# Patient Record
Sex: MALE | Race: WHITE
[De-identification: names, ages, dates, MRNs, and addresses within clinical notes are randomized per-mention and may not be internally consistent; named-entity substitution may affect disease eponyms.]

---

## 2017-08-02 ENCOUNTER — HOSPITAL ENCOUNTER (OUTPATIENT)
Dept: HOSPITAL 76 - LAB.R | Age: 82
End: 2017-08-02
Attending: INTERNAL MEDICINE
Payer: MEDICARE

## 2017-08-02 DIAGNOSIS — E78.5: ICD-10-CM

## 2017-08-02 DIAGNOSIS — Z12.5: Primary | ICD-10-CM

## 2017-08-02 DIAGNOSIS — Z79.899: ICD-10-CM

## 2017-08-02 LAB
ALBUMIN/GLOB SERPL: 1.3 {RATIO} (ref 1–2.2)
ANION GAP SERPL CALCULATED.4IONS-SCNC: 8 MMOL/L (ref 6–13)
BILIRUB BLD-MCNC: 0.7 MG/DL (ref 0.2–1)
BUN SERPL-MCNC: 29 MG/DL (ref 6–20)
CALCIUM UR-MCNC: 9.1 MG/DL (ref 8.5–10.3)
CHLORIDE SERPL-SCNC: 105 MMOL/L (ref 101–111)
CHOLEST SERPL-MCNC: 231 MG/DL
CO2 SERPL-SCNC: 27 MMOL/L (ref 21–32)
CREAT SERPLBLD-SCNC: 1 MG/DL (ref 0.6–1.2)
GFRSERPLBLD MDRD-ARVRAT: 72 ML/MIN/{1.73_M2} (ref 89–?)
GLOBULIN SER-MCNC: 3 G/DL (ref 2.1–4.2)
GLUCOSE SERPL-MCNC: 96 MG/DL (ref 70–100)
HDLC SERPL-MCNC: 88 MG/DL
HDLC SERPL: 2.6 {RATIO} (ref ?–5)
LDLC/HDLC SERPL: 1.5 {RATIO} (ref ?–3.6)
POTASSIUM SERPL-SCNC: 4.3 MMOL/L (ref 3.5–5)
PROT SPEC-MCNC: 6.9 G/DL (ref 6.7–8.2)
SODIUM SERPLBLD-SCNC: 140 MMOL/L (ref 135–145)
TRIGL P FAST SERPL-MCNC: 52 MG/DL
VLDLC SERPL-SCNC: 10 MG/DL

## 2017-08-02 PROCEDURE — 80061 LIPID PANEL: CPT

## 2017-08-02 PROCEDURE — 84153 ASSAY OF PSA TOTAL: CPT

## 2017-08-02 PROCEDURE — 82550 ASSAY OF CK (CPK): CPT

## 2017-08-02 PROCEDURE — 80053 COMPREHEN METABOLIC PANEL: CPT

## 2017-10-04 ENCOUNTER — HOSPITAL ENCOUNTER (OUTPATIENT)
Dept: HOSPITAL 76 - LAB.R | Age: 82
Discharge: HOME | End: 2017-10-04
Attending: INTERNAL MEDICINE
Payer: MEDICARE

## 2017-10-04 DIAGNOSIS — R97.20: Primary | ICD-10-CM

## 2017-10-04 DIAGNOSIS — Z87.898: ICD-10-CM

## 2017-10-04 DIAGNOSIS — E78.5: ICD-10-CM

## 2017-10-04 LAB
CHOLEST SERPL-MCNC: 279 MG/DL
HDLC SERPL-MCNC: 81 MG/DL
HDLC SERPL: 3.4 {RATIO} (ref ?–5)
LDLC/HDLC SERPL: 2.3 {RATIO} (ref ?–3.6)
PSA FREE SERPL-MCNC: 0.54 NG/ML (ref 0.16–2.81)
PSA SERPL-MCNC: 1.58 NG/ML (ref 0–2)
TRIGL P FAST SERPL-MCNC: 70 MG/DL
VLDLC SERPL-SCNC: 14 MG/DL

## 2017-10-04 PROCEDURE — 80061 LIPID PANEL: CPT

## 2017-10-04 PROCEDURE — 84154 ASSAY OF PSA FREE: CPT

## 2017-10-04 PROCEDURE — 82550 ASSAY OF CK (CPK): CPT

## 2017-11-16 ENCOUNTER — HOSPITAL ENCOUNTER (INPATIENT)
Dept: HOSPITAL 76 - ED | Age: 82
LOS: 5 days | Discharge: HOME | DRG: 65 | End: 2017-11-21
Attending: NURSE PRACTITIONER | Admitting: NURSE PRACTITIONER
Payer: MEDICARE

## 2017-11-16 DIAGNOSIS — E78.5: ICD-10-CM

## 2017-11-16 DIAGNOSIS — R47.01: ICD-10-CM

## 2017-11-16 DIAGNOSIS — I63.532: Primary | ICD-10-CM

## 2017-11-16 DIAGNOSIS — G81.91: ICD-10-CM

## 2017-11-16 DIAGNOSIS — R20.0: ICD-10-CM

## 2017-11-16 DIAGNOSIS — Z85.828: ICD-10-CM

## 2017-11-16 DIAGNOSIS — Z79.899: ICD-10-CM

## 2017-11-16 DIAGNOSIS — R29.703: ICD-10-CM

## 2017-11-16 DIAGNOSIS — Z79.82: ICD-10-CM

## 2017-11-16 LAB
ALBUMIN/GLOB SERPL: 1.5 {RATIO} (ref 1–2.2)
ANION GAP SERPL CALCULATED.4IONS-SCNC: 6 MMOL/L (ref 6–13)
BASOPHILS NFR BLD AUTO: 0 10^3/UL (ref 0–0.1)
BASOPHILS NFR BLD AUTO: 0.9 %
BILIRUB BLD-MCNC: 0.6 MG/DL (ref 0.2–1)
BUN SERPL-MCNC: 20 MG/DL (ref 6–20)
CALCIUM UR-MCNC: 8.8 MG/DL (ref 8.5–10.3)
CHLORIDE SERPL-SCNC: 110 MMOL/L (ref 101–111)
CO2 SERPL-SCNC: 21 MMOL/L (ref 21–32)
CREAT SERPLBLD-SCNC: 0.9 MG/DL (ref 0.6–1.2)
CUL URINE ADD CHARGE: (no result)
EOSINOPHIL # BLD AUTO: 0.1 10^3/UL (ref 0–0.7)
EOSINOPHIL NFR BLD AUTO: 2 %
ERYTHROCYTE [DISTWIDTH] IN BLOOD BY AUTOMATED COUNT: 13.4 % (ref 12–15)
GFRSERPLBLD MDRD-ARVRAT: 81 ML/MIN/{1.73_M2} (ref 89–?)
GLOBULIN SER-MCNC: 2.4 G/DL (ref 2.1–4.2)
GLUCOSE SERPL-MCNC: 98 MG/DL (ref 70–100)
HCT VFR BLD AUTO: 40.4 % (ref 42–52)
HGB UR QL STRIP: 13.5 G/DL (ref 14–18)
LIPASE SERPL-CCNC: 37 U/L (ref 22–51)
LYMPHOCYTES # SPEC AUTO: 1 10^3/UL (ref 1.5–3.5)
LYMPHOCYTES NFR BLD AUTO: 20.5 %
MCH RBC QN AUTO: 30.4 PG (ref 27–31)
MCHC RBC AUTO-ENTMCNC: 33.4 G/DL (ref 32–36)
MCV RBC AUTO: 90.9 FL (ref 80–94)
MONOCYTES # BLD AUTO: 0.4 10^3/UL (ref 0–1)
MONOCYTES NFR BLD AUTO: 7.4 %
NEUTROPHILS # BLD AUTO: 3.4 10^3/UL (ref 1.5–6.6)
NEUTROPHILS # SNV AUTO: 4.9 X10^3/UL (ref 4.8–10.8)
NEUTROPHILS NFR BLD AUTO: 69.2 %
NRBC # BLD AUTO: 0 /100WBC
PDW BLD AUTO: 9.3 FL (ref 7.4–11.4)
PH UR STRIP.AUTO: 7.5 PH (ref 5–7.5)
POTASSIUM SERPL-SCNC: 4.2 MMOL/L (ref 3.5–5)
PROT SPEC-MCNC: 6 G/DL (ref 6.7–8.2)
RBC MAR: 4.44 10^6/UL (ref 4.7–6.1)
SODIUM SERPLBLD-SCNC: 137 MMOL/L (ref 135–145)
SP GR UR STRIP.AUTO: 1.01 (ref 1–1.03)
UA CHARGE (STRIP ONLY): YES
UA W/ MICROSCOPIC CHARGE: (no result)
UR CULTURE IF IND: (no result)
UROBILINOGEN UR STRIP.AUTO-MCNC: NEGATIVE MG/DL
WBC # BLD: 4.9 X10^3/UL

## 2017-11-16 PROCEDURE — 99284 EMERGENCY DEPT VISIT MOD MDM: CPT

## 2017-11-16 PROCEDURE — 87086 URINE CULTURE/COLONY COUNT: CPT

## 2017-11-16 PROCEDURE — 96360 HYDRATION IV INFUSION INIT: CPT

## 2017-11-16 PROCEDURE — 36415 COLL VENOUS BLD VENIPUNCTURE: CPT

## 2017-11-16 PROCEDURE — 96372 THER/PROPH/DIAG INJ SC/IM: CPT

## 2017-11-16 PROCEDURE — 70496 CT ANGIOGRAPHY HEAD: CPT

## 2017-11-16 PROCEDURE — 83735 ASSAY OF MAGNESIUM: CPT

## 2017-11-16 PROCEDURE — 81001 URINALYSIS AUTO W/SCOPE: CPT

## 2017-11-16 PROCEDURE — 70551 MRI BRAIN STEM W/O DYE: CPT

## 2017-11-16 PROCEDURE — 70498 CT ANGIOGRAPHY NECK: CPT

## 2017-11-16 PROCEDURE — 93005 ELECTROCARDIOGRAM TRACING: CPT

## 2017-11-16 PROCEDURE — 85025 COMPLETE CBC W/AUTO DIFF WBC: CPT

## 2017-11-16 PROCEDURE — 80053 COMPREHEN METABOLIC PANEL: CPT

## 2017-11-16 PROCEDURE — 96361 HYDRATE IV INFUSION ADD-ON: CPT

## 2017-11-16 PROCEDURE — 99285 EMERGENCY DEPT VISIT HI MDM: CPT

## 2017-11-16 PROCEDURE — 80061 LIPID PANEL: CPT

## 2017-11-16 PROCEDURE — 70450 CT HEAD/BRAIN W/O DYE: CPT

## 2017-11-16 PROCEDURE — 83690 ASSAY OF LIPASE: CPT

## 2017-11-16 PROCEDURE — 81003 URINALYSIS AUTO W/O SCOPE: CPT

## 2017-11-16 PROCEDURE — 93306 TTE W/DOPPLER COMPLETE: CPT

## 2017-11-16 RX ADMIN — SODIUM CHLORIDE SCH MLS/HR: 9 INJECTION, SOLUTION INTRAVENOUS at 16:47

## 2017-11-16 RX ADMIN — SODIUM CHLORIDE, PRESERVATIVE FREE SCH ML: 5 INJECTION INTRAVENOUS at 16:47

## 2017-11-16 NOTE — MRI REPORT
EXAM:

MRI BRAIN WITHOUT CONTRAST

 

EXAM DATE: 11/16/2017 04:07 PM.

 

CLINICAL HISTORY: Right-sided weakness. Evaluate for stroke.

 

COMPARISON: Prior CT head, CT angiogram head and neck performed earlier today at 1149 hrs..

 

TECHNIQUE: Multiplanar, multisequence T1-weighted and fluid-sensitive MR sequences of the brain were 
performed. Sequences optimized for routine evaluation. Other: None. IV Contrast: None.

 

Findings: Relevant images are indicated (image number, series number). 

 

Patchy, incomplete diffusion restriction present left PCA territory, corresponding low signal ADC. Th
is includes left hippocampus, left occipital pole. There is corresponding increased signal present se
en on axial FLAIR. There is no hemorrhage or mass effect.

 

Remaining brain demonstrates no acute or subacute ischemic change.

 

Basal cisterns, bilateral IACs, bilateral Meckel's caves are clear. Orbital contents negative. There 
are normal expected vascular flow voids of the major arteries and veins. As seen earlier, again seen 
prominent enlargement of the lateral ventricles, atria. Prominent generalized atrophy of the brain pr
esent.

 

No significant superimposed white matter disease.

 

Empty sella. Punctate old ischemic disease present posterior central mildred. Moderate midbrain atrophy.
 Craniocervical junction, limited evaluation upper cervical cord negative.

 

Impressions: 

1. Subacute patchy left PCA territory ischemic stroke. No hemorrhage or mass effect. 

2. Moderate generalized cortical atrophy with marked compensatory ventricular enlargement. Moderate m
idbrain atrophy. No significant white matter disease present.

 

Critical result: Findings discussed immediately with nurse practitioner Aditya Benjamin by phone on 11/16/
2017 at 1632 hrs.

 

[facility]

 

 

Referring Provider Line: [phone]

 

SITE ID: [id number]

## 2017-11-16 NOTE — ED PHYSICIAN DOCUMENTATION
PD HPI FOCAL NEURO





- Stated complaint


Stated Complaint: R SIDE NUMBNESS/DIZZY





- Chief complaint


Chief Complaint: Neuro





- History obtained from


History obtained from: Patient





- History of Present Illness


Timing - onset: Last night


Timing - details: Still present (worse this morning about 10 am), Waxing and 

waning


Severity of deficit: Moderate (noted numbness feeling right arm and leg, 

Somewhat on the face as well last evening and earlier this morning.  His wife 

also thought that he was having trouble with thought process.  He had onset of 

the symptoms yesterday evening and last night.  He was not sure if the numbness 

was still there when he first awoke this morning but noted it particularly 

around 10 AM.  However his wife said he was having troubles with thought 

process right when he was awake this morning.  There is no weakness noted.  He 

was slightly off balance walking.  He denied any headache or head injury.)


Weakness: No: Face, Arm, Hand, Leg, Foot, Right, Left, Other


Numbness: Face, Arm, Leg, Right


Associated symptoms: No: Headache, Nausea / vomiting, Seizure, Syncope, Head 

injury, Chest pain


Contributing factors: negative: Anticoagulated, Atrial fibrillation


Baseline status: positive: A&OX3, ambulatory, indep (he was actually out doing 

yard work yesterday, without injury. He felt that he was well hydrated through 

the day.)


Recently seen: Not recently seen





Review of Systems


Constitutional: denies: Fever, Chills


Eyes: denies: Loss of vision, Decreased vision


Ears: denies: Loss of hearing, Tinnitus/ringing


Nose: denies: Rhinorrhea / runny nose, Congestion


Throat: denies: Sore throat


Cardiac: denies: Chest pain / pressure, Palpitations


Respiratory: denies: Dyspnea


GI: denies: Abdominal Pain, Abdominal Swelling, Nausea, Vomiting, Diarrhea, 

Bloody / black stool


: denies: Dysuria, Frequency


Skin: denies: Rash, Lesions


Neurologic: reports: Numbness.  denies: Generalized weakness, Focal weakness, 

Headache, Head injury


Psychiatric: denies: Depressed, Insomnia


Endocrine: denies: Easy bruising / bleeding


Immunocompromised: denies: Immunocompromised





PD PAST MEDICAL HISTORY





- Past Medical History


Past Medical History: No


Cardiovascular: High cholesterol





- Past Surgical History


Past Surgical History: Yes


Derm: Skin cancer surgery





- Present Medications


Home Medications: 


 Ambulatory Orders











 Medication  Instructions  Recorded  Confirmed


 


Aspirin 81 mg PO DAILY 11/16/17 11/16/17


 


Multivitamin [Theragran] 1 tab PO DAILY 11/16/17 11/16/17














- Allergies


Allergies/Adverse Reactions: 


 Allergies











Allergy/AdvReac Type Severity Reaction Status Date / Time


 


No Known Drug Allergies Allergy   Verified 11/16/17 11:38














- Living Situation


Living Situation: reports: With spouse/s.o.


Living Arrangement: reports: At home





- Social History


Does the pt smoke?: No


Smoking Status: Never smoker


Does the pt drink ETOH?: Yes


Does the pt have substance abuse?: No





- Family History


Family history: reports: Non contributory





- Immunizations


Immunizations are current?: Yes





- POLST


Patient has POLST: No





PD ED PE NORMAL





- Vitals


Vital signs reviewed: Yes





- General


General: Alert and oriented X 3, Well developed/nourished, Other (slightly slow 

processing answers, but content of answers is good. )





- HEENT


HEENT: Atraumatic, PERRL, EOMI, Ears normal, Moist mucous membranes, Pharynx 

benign





- Neck


Neck: Supple, no meningeal sign, No adenopathy





- Cardiac


Cardiac: RRR, No murmur





- Respiratory


Respiratory: Clear bilaterally





- Abdomen


Abdomen: Soft, Non tender





- Male 


Male : Deferred





- Rectal


Rectal: Deferred





- Back


Back: No CVA TTP





- Derm


Derm: Normal color, Warm and dry, No rash





- Extremities


Extremities: No deformity, No tenderness to palpate, Normal ROM s pain, No edema

, No calf tenderness / cord





- Neuro


Neuro: Alert and oriented X 3, CNs 2-12 intact, No motor deficit, Other (some 

decreased sensation right arm and leg to touch. He is able to talk coherently 

and content is correct/appropriate but the process is slow. For the NIHSS, he 

did well with the visual naming and the picture, but could not read the words 

nor sentences, tried to spell them and then could not do that either.  )


Eye Opening: Spontaneous


Motor: Obeys Commands


Verbal: Oriented


GCS Score: 15





- Psych


Psych: Normal mood, Normal affect





NIHSS





- Level of Consciousness


Level of consciousness: (0) Alert, Keenly responsive


LOC Questions: (0) Answers both Q's correct


LOC Commands: (0) Performs both correctly





- Gaze


Best Gaze: (0) Normal





- Visual


Visual: (0) No loss





- Facial Palsy


Facial Palsy: (0) Normal, symmetrical movement





- Motor Arms (both separate)


Motor Arm (right): (1) Drift


Motor Arm (left): (0) No drift





- Motor Legs (both separate)


Motor Leg (right): (0) No drift


Motor Leg (left): (0) No drift





- Limb Ataxia


Limb Ataxia: (0) Absent





- Sensory


Sensory: (1) Mild-to-moderate loss





- Best Language


Best Language: (1) mild-to-aphasia





- Dysarthria


Dysarthria: (0) Normal





- Extinction and Inattention (formally neg


Extinction and inattention: (0) No abnormality





- Total Score/Results


Total Score/Result: 3





Results





- Vitals


Vitals: 


 Vital Signs - 24 hr











  11/16/17 11/16/17 11/16/17





  11:34 12:59 14:13


 


Temperature  36.2 C L 36.7 C


 


Heart Rate 72 66 63


 


Respiratory 16 15 15





Rate   


 


Blood Pressure 157/79 H 141/88 H 138/78 H


 


O2 Saturation 98 98 97








 Oxygen











O2 Source                      Room air

















- Labs


Labs: 


 Laboratory Tests











  11/16/17 11/16/17 11/16/17





  11:36 13:37 13:37


 


WBC   4.9 


 


RBC   4.44 L 


 


Hgb   13.5 L 


 


Hct   40.4 L 


 


MCV   90.9 


 


MCH   30.4 


 


MCHC   33.4 


 


RDW   13.4 


 


Plt Count   174 


 


MPV   9.3 


 


Neut #   3.4 


 


Lymph #   1.0 L 


 


Mono #   0.4 


 


Eos #   0.1 


 


Baso #   0.0 


 


Absolute Nucleated RBC   0.00 


 


Nucleated RBC %   0.0 


 


Sodium    137


 


Potassium    4.2


 


Chloride    110


 


Carbon Dioxide    21


 


Anion Gap    6.0


 


BUN    20


 


Creatinine    0.9


 


Estimated GFR (MDRD)    81 L


 


Glucose    98


 


POC Whole Bld Glucose  126 H  


 


Calcium    8.8


 


Total Bilirubin    0.6


 


AST    25


 


ALT    21


 


Alkaline Phosphatase    60


 


Total Protein    6.0 L


 


Albumin    3.6


 


Globulin    2.4


 


Albumin/Globulin Ratio    1.5


 


Lipase    37














- Rads (name of study)


  ** head CT


Radiology: Prelim report reviewed (no acute process)





  ** head and neck angio


Radiology: Prelim report reviewed (posterior occlusion with collateral flow, 

presume nonacute. No other acute process. )





PD MEDICAL DECISION MAKING





- ED course


Complexity details: reviewed results, re-evaluated patient (Symptoms are about 

the same.  He was given IV fluids for hydration.  He did feel that he hydrated 

well through the yard work yesterday.  There is no injury.  His symptoms sound 

suggestive of a CVA.  He was given additional aspirin after his CT did not show 

any bleeding.  The patient to get an angiogram which did not show an obvious 

new defect.  The posterior occlusion with collateral flow sounds to be older 

and would not correlate with his area of symptoms for language and unilateral 

numbness.  The patient will be admitted to the hospital for further workup and 

evaluation as well as OT and PT evaluations.), considered differential, d/w 

patient, d/w consultant ([facility] - who felt symptoms low enough and 

timing not clear (may be from last evening) that TPA would not be beneficial 

enough to balance potential harm. I talked with patient/wife about this thought 

process as well. ), other (Talked with Dr. Anthony, Hospitalist)





Departure





- Departure


Disposition: [facility] DC/Xfer


Clinical Impression: 


 Numbness on right side, Receptive aphasia, Stroke-like symptoms





Condition: Stable


Record reviewed to determine appropriate education?: Yes

## 2017-11-16 NOTE — CT REPORT
EXAM:

CT ANGIOGRAM HEAD.

CT SCAN OF THE HEAD WITH CONTRAST.

 

EXAM DATE: 11/16/2017 12:13 PM

 

CLINICAL HISTORY: Right side weakness and numbness.

 

COMPARISON: No prior CTA.

 

TECHNIQUE:

1. Using a multidetector scanner, axial images were acquired from the foramen magnum to the skull clemente
mini prior to and following contrast administration. 

2. Using a multidetector scanner, high-resolution axial images were acquired from the skull base thro
ugh vertex following rapid infusion of intravenous contrast. Reformats: Multiplanar MIP reformats wer
e reconstructed. Nascet criteria used for stenosis measurement.

 

IV Contrast: 100 cc Isovue-300.

 

In accordance with CT protocol optimization, one or more of the following dose reduction techniques w
ere utilized for this exam: automated exposure control, adjustment of mA and/or KV based on patient s
ize, or use of iterative reconstructive technique.

 

FINDINGS:  

No abnormal brain enhancement. No evidence for intracranial enhancing space-occupying mass.

 

Focal chronic atherosclerotic calcifications of the proximal right intradural vertebral artery. No as
sociated flow limiting arterial luminal stenosis.

 

Mild atherosclerotic calcifications of the cavernous and paraclinoid segments of both internal caroti
d arteries.

 

Abrupt cut off of the proximal left PCA P2 segment. Occlusion is age indeterminate. Correlate clinica
lly with signs and/or symptoms of left PCA vascular insufficiency. Note that contrast opacified more 
peripheral branch arteries are contrast opacified in the more distal left PCA territory including the
 inferior temporal lobe and medial left occipital lobe suggesting some degree of collateral flow.

 

Minimal to mild segmental stenosis of the proximal right PCA, this does not appear flow-limiting.

 

No proximal flow limiting stenosis or occlusion of the central segments of the anterior or middle cer
ebral arteries.

 

No Kiana of [place] aneurysm.

 

Contrast opacification of the major dural venous sinuses is present as expected.

 

IMPRESSION:

Proximal left PCA P2 segment occlusion.

 

No other evidence for proximal intracranial large artery flow-limiting stenosis or occlusion.

 

Findings discussed by telephone with Dr. Douglas at 12:45 PM 11/16/2017.

 

 

[facility]

Referring Provider Line: [phone]

 

SITE ID: [id number]

## 2017-11-16 NOTE — CT REPORT
EXAM:

CT HEAD

 

EXAM DATE: 11/16/2017 11:58 AM.

 

CLINICAL HISTORY: Right side numbness. Also reported right-sided weakness.

 

COMPARISON: None.

 

TECHNIQUE: Multiaxial CT images were obtained from the foramen magnum to the vertex. Reformats: Coron
al. IV contrast: None.

 

In accordance with CT protocol optimization, one or more of the following dose reduction techniques w
ere utilized for this exam: automated exposure control, adjustment of mA and/or KV based on patient s
ize, or use of iterative reconstructive technique.

 

FINDINGS:

Mild diffuse atrophy. No evidence for acute hemorrhage or stroke. Nonspecific ventriculomegaly, more 
likely chronic than acute. No midline shift or abnormal subdural fluid collection.

 

Mild paranasal sinus mucosal thickening. Intact calvarium.

 

IMPRESSION: No CT evidence for acute intracranial abnormality.

 

[facility]

Referring Provider Line: [phone]

 

SITE ID: [id number]

## 2017-11-16 NOTE — CT REPORT
EXAM:

CT ANGIOGRAM NECK

 

EXAM DATE: 11/16/2017 11:49 AM.

 

CLINICAL HISTORY: Report of right-sided weakness and numbness.

 

COMPARISON: None.

 

TECHNIQUE: Routine axial helical imaging was performed from the skull base through the aortic arch. I
V Contrast: 100 mL Isovue-300. Reconstructions: Routine multiplanar 3D MIP reconstructions. Evaluatio
n of arterial stenosis is based on a NASCET method of measurement.

 

In accordance with CT protocol optimization, one or more of the following dose reduction techniques w
ere utilized for this exam: automated exposure control, adjustment of mA and/or KV based on patient s
ize, or use of iterative reconstructive technique.

 

FINDINGS: 

The top of the aortic arch and the origins of the great vessels are patent. No acute abnormality or f
ocal flow limiting stenosis of the cervical vertebral or carotid arteries. No significant-appearing a
therosclerotic changes at the cervical carotid bifurcations. Incidental left cervical ICA tortuosity.


 

Mild-to-moderate chronic-appearing multilevel degenerative cervical spinal spondylosis. No acute apic
al pulmonary parenchymal disease.

 

IMPRESSION: No acute abnormality or significant stenosis of the cervical vertebral carotid arteries.

 

[facility]

Referring Provider Line: [phone]

 

SITE ID: [id number]

## 2017-11-16 NOTE — CT PRELIMINARY REPORT
Accession: [id number]

Exam: CT HEAD ANGIO

 

Impression:

 

Proximal left PCA P2 segment occlusion.

 

No other evidence for proximal intracranial large artery flow-limiting stenosis or occlusion.

 

 

 

[facility]

 

SITE ID: [id number]

## 2017-11-16 NOTE — MRI PRELIMINARY REPORT
Accession: [id number]

Exam: MRI BRAIN W/O

 

Impressions: 

1. Subacute patchy left PCA territory ischemic stroke. No hemorrhage or mass effect. 

2. Moderate generalized cortical atrophy with marked compensatory ventricular enlargement. Moderate m
idbrain atrophy. No significant white matter disease present.

 

Critical result: Findings discussed immediately with nurse practitioner Aditya Benjamin by phone on 11/16/
2017 at 1632 hrs.

 

[facility]

 

 

 

SITE ID: [id number]

## 2017-11-16 NOTE — HISTORY & PHYSICAL EXAMINATION
Chief Complaint





- Chief Complaint


Chief Complaint: slow speech and numbness at right side of body





History of Present Illness





- Admitted From


Admitted From:: ER





- History Obtained From


History obtained from: pt





- History of Present Illness


HPI Comment/Other: 


This is a pleasure 82-year-old  male with HLP past medical history, 

otherwise health, who present ER for evaluation of focal neurological deficit. 

Pt report on last night about 9pm he began to feel numbness on right side upper 

and lower extremities, and right face. Then pt though it would go away, and 

went to sleep. In the today morning, pt still felt right side numbness, and 

with slower speech than usually and mild gait imbalance. Pt's wife felt it is 

unusual then bring pt to ER. Pt still present all these symptoms in the ER. Pt 

denies headache, any injury, chest pain, SOB, fever, chill, abdominal pain, 

nausea, vomiting, vision changing. CT of head reveals unremarkable. Lab test is 

unremarkable as well.


pt was admitted for CVA evaluation.








History





- Past Medical History


Cardiovascular: reports: High cholesterol


Respiratory: reports: None


Neuro: reports: None


Endocrine/Autoimmune: reports: None


GI: reports: None


: reports: None


HEENT: reports: None


Psych: reports: None


Musculoskeletal: reports: None


Derm: reports: Other


MRSA Hx?: No





- Past Surgical History


Derm: reports: Skin cancer surgery





- Family & Social History


Living arrangement: At home


Living Situation: With spouse/s.o.





- Substance History


Use: Uses substance without health or social issues: NONE


Abuse: Recurrent use of substance despite neg consequences: NONE





- POLST


Patient has POLST: No


POLST Status: Full Code





Meds/Allgy





- Home Medications


Home Medications: 


 Ambulatory Orders











 Medication  Instructions  Recorded  Confirmed


 


Aspirin 81 mg PO DAILY 11/16/17 11/16/17


 


Cholecalciferol (Vitamin D3) 1,000 units PO DAILY 11/16/17 11/16/17





[Vitamin D3]   


 


Multivitamin [Theragran] 1 tab PO DAILY 11/16/17 11/16/17


 


Rosuvastatin Calcium [Crestor] 10 mg PO DAILY 11/16/17 11/16/17














- Allergies


Allergies/Adverse Reactions: 


 Allergies











Allergy/AdvReac Type Severity Reaction Status Date / Time


 


No Known Drug Allergies Allergy   Verified 11/16/17 11:38














Review of Systems





- Constitutional


Constitutional: denies: Fatigue, Fever, Chills, Malaise





- Eyes


Eyes: denies: Pain, Irritation, Amaurosis, Blurred vision, Spots in vision, 

Field loss, Vision loss, Dipolpia





- Ears, Nose & Throat


Ears, Nose & Throat: denies: Ear pain, Hearing loss, Hearing aids, Tinnitus, 

Vertigo, Nasal pain, Nasal discharge, Nosebleeds, Nasal obstruction, Postnasal 

drainage, Dentures, Sore throat, Hoarseness, Bleeding gums





- Cardiovascular


Cariovascular: denies: Irregular heart rate, Palpitations, Chest pain, Edema, 

Lightheadedness, Syncope, Exertional dyspnea, Decr. exercise tolerance





- Respiratory


Respiratory: denies: Cough, Sputum production, Wheezing, Snoring, Hemoptysis, 

Orthopnea, SOB at rest, SOB with exertion





- Gastrointestinal


Gastrointestinal: denies: Abdominal pain, Abdominal distention, Constipation, 

Diarrhea, Change in bowel habits, Rectal bleeding, Black stools, Bloody stools, 

Nausea, Vomiting, Bile emesis, Daryl blood emesis, Coffee grounds emesis, Reflux

/heartburn





- Genitourinary


Genitourinary: denies: Dysuria, Frequency, Urgency, Hematuria, Incontinence, 

Flank pain, Nocturia, Urethral discharge





- Musculoskeletal


Musculoskeletal: denies: Muscle pain, Back pain, Muscle aches, Stiffness, 

Limited range of motion, Muscle weakness, Gout, Joint pain





- Integumentary


Integumentary: denies: Rash, Pruritis, Lesions, Dryness, Acne, Pigment changes





- Neurological


Neurological: reports: Numbness, Abnormal gait, Slurred speech.  denies: 

General weakness, Focal weakness, Headache, Dizziness, Memory problems, Pre-

existing deficit, Seizures, Incoordination





- Psychiatric


Psychiatric: denies: Depression, Anxiety, Suicidal, Delusions, Hallucinations, 

Homicidal





- Endocrine


Endocrine: denies: Polyuria, Polydypsia, Polyphagia, Intolerance to cold, 

Intolerance to heat





- Hematologic/Lymphatic


Hematologic/Lymphatic: denies: Anemia, Bruising, Petechiae, Blood clots, 

Lymphadenopathy, Bleeding tendencies, Recurrent infections





Exam





- Vital Signs


Reviewed Vital Signs: Yes


Vital Signs: 





 Vital Signs x48h











  Temp Pulse Resp BP BP Pulse Ox


 


 11/16/17 16:21  37.0 C   16   156/81 H  98


 


 11/16/17 15:20   78  14  139/76 H   96














- Physical Exam


General Appearance: positive: No acute distress, Alert.  negative: Lethargic


Eyes Bilateral: positive: Normal inspection, PERRL, No lid inflammation, 

Conjunctivae nml


ENT: positive: ENT inspection nml, Pharynx nml, No signs of dehydration.  

negative: Purulent nasal drainage, Pharyngeal erythema, Oral lesions


Neck: positive: Nml inspection, Thyroid nml, No JVD, Trachea midline.  negative

: Thyromegaly, Lymphadenopathy (R), Lymphadenopathy (L), Stiff neck, Carotid 

bruit, Swelling/bruising, Tracheal deviation


Respiratory: positive: Chest non-tender, No respiratory distress, Breath sounds 

nml.  negative: Wheezes, Rales, Rhonchi


Cardiovascular: positive: Regular rate & rhythm, No murmur, No gallop.  negative

: Irregularly irregular, Extrasystoles, Tachycardia, Bradycardia, Systolic 

murmur, Diastolic murmur


Peripheral Pulses: positive: 2+


Abdomen: positive: Non-tender, No organomegaly, Nml bowel sounds, No 

distention.  negative: Tenderness, Guarding, Rebound


Back: positive: Nml inspection.  negative: CVA tenderness (R), CVA tenderness (L

)


Skin: positive: Color nml, No rash, Warm, Dry.  negative: Cyanosis, Diaphoresis

, Pallor, Skin rash, Decubitus


Extremities: positive: Non-tender, Full ROM, Nml appearance.  negative: Pedal 

edema, Joint swelling, Ollie's sign/cords


Neurologic/Psychiatric: positive: Oriented x3, Motor nml, Mood/affect nml, 

Slurred/abnml speech.  negative: Sensation nml, Weakness, Sensory loss, Facial 

droop





Conclusion/Plan





- Problem List


(1) Numbness on right side


Conclusion/Plan: 


pt's symptoms suggest pt may have CVA at left side


MRI


MRA of brain, and neck


ECHO


lipid panel


PT/OT/ST


Aspinin 325mg daily


Lipitor 40 mg daily


NPO until evaluated


allow SBP until 190








(2) Hyperlipidemia


Conclusion/Plan: 


will check Lipid panel


Lipitor 40 mg daily








(3) DVT prophylaxis


Conclusion/Plan: 


SCD and Lovenox








(4) Full code status


Conclusion/Plan: 


pt request full code status








- Lab Results


Fish Bones: 


 11/16/17 13:37





 11/16/17 13:37





Issues/Core Measures





- Anticipated LOS


Anticipated Stay Length: Less than 2 midnights (less than two midnights expected

)

## 2017-11-16 NOTE — CT PRELIMINARY REPORT
Accession: [id number]

Exam: CT NECK ANGIO

 

Neck CT angiogram IMPRESSION: No acute abnormality or significant stenosis of the cervical vertebral 
carotid arteries.

 

[facility]

 

SITE ID: [id number]

## 2017-11-16 NOTE — CT PRELIMINARY REPORT
Accession: [id number]

Exam: CT HEAD W/O

 

IMPRESSION: No CT evidence for acute intracranial abnormality.

 

[facility]

 

SITE ID: [id number]

## 2017-11-17 LAB
ALBUMIN/GLOB SERPL: 1.5 {RATIO} (ref 1–2.2)
ANION GAP SERPL CALCULATED.4IONS-SCNC: 6 MMOL/L (ref 6–13)
BASOPHILS NFR BLD AUTO: 0 10^3/UL (ref 0–0.1)
BASOPHILS NFR BLD AUTO: 0.5 %
BILIRUB BLD-MCNC: 0.9 MG/DL (ref 0.2–1)
BUN SERPL-MCNC: 13 MG/DL (ref 6–20)
CALCIUM UR-MCNC: 8.5 MG/DL (ref 8.5–10.3)
CHLORIDE SERPL-SCNC: 106 MMOL/L (ref 101–111)
CHOLEST SERPL-MCNC: 158 MG/DL
CO2 SERPL-SCNC: 25 MMOL/L (ref 21–32)
CREAT SERPLBLD-SCNC: 0.9 MG/DL (ref 0.6–1.2)
EOSINOPHIL # BLD AUTO: 0.3 10^3/UL (ref 0–0.7)
EOSINOPHIL NFR BLD AUTO: 4 %
ERYTHROCYTE [DISTWIDTH] IN BLOOD BY AUTOMATED COUNT: 13.4 % (ref 12–15)
GFRSERPLBLD MDRD-ARVRAT: 81 ML/MIN/{1.73_M2} (ref 89–?)
GLOBULIN SER-MCNC: 2.2 G/DL (ref 2.1–4.2)
GLUCOSE SERPL-MCNC: 93 MG/DL (ref 70–100)
HCT VFR BLD AUTO: 40.3 % (ref 42–52)
HDLC SERPL-MCNC: 62 MG/DL
HDLC SERPL: 2.5 {RATIO} (ref ?–5)
HGB UR QL STRIP: 13.1 G/DL (ref 14–18)
LDLC/HDLC SERPL: 1.4 {RATIO} (ref ?–3.6)
LYMPHOCYTES # SPEC AUTO: 1.7 10^3/UL (ref 1.5–3.5)
LYMPHOCYTES NFR BLD AUTO: 26.5 %
MAGNESIUM SERPL-MCNC: 1.8 MG/DL (ref 1.7–2.8)
MCH RBC QN AUTO: 30 PG (ref 27–31)
MCHC RBC AUTO-ENTMCNC: 32.6 G/DL (ref 32–36)
MCV RBC AUTO: 92.1 FL (ref 80–94)
MONOCYTES # BLD AUTO: 0.5 10^3/UL (ref 0–1)
MONOCYTES NFR BLD AUTO: 7.3 %
NEUTROPHILS # BLD AUTO: 3.9 10^3/UL (ref 1.5–6.6)
NEUTROPHILS # SNV AUTO: 6.4 X10^3/UL (ref 4.8–10.8)
NEUTROPHILS NFR BLD AUTO: 61.7 %
NRBC # BLD AUTO: 0.1 /100WBC
PDW BLD AUTO: 9.3 FL (ref 7.4–11.4)
POTASSIUM SERPL-SCNC: 3.7 MMOL/L (ref 3.5–5)
PROT SPEC-MCNC: 5.5 G/DL (ref 6.7–8.2)
RBC MAR: 4.38 10^6/UL (ref 4.7–6.1)
SODIUM SERPLBLD-SCNC: 137 MMOL/L (ref 135–145)
TRIGL P FAST SERPL-MCNC: 58 MG/DL
VLDLC SERPL-SCNC: 12 MG/DL
WBC # BLD: 6.4 X10^3/UL

## 2017-11-17 RX ADMIN — SODIUM CHLORIDE, PRESERVATIVE FREE SCH: 5 INJECTION INTRAVENOUS at 05:57

## 2017-11-17 RX ADMIN — FAMOTIDINE SCH MG: 20 TABLET, FILM COATED ORAL at 08:13

## 2017-11-17 RX ADMIN — POLYETHYLENE GLYCOL 3350 SCH: 17 POWDER, FOR SOLUTION ORAL at 08:14

## 2017-11-17 RX ADMIN — VITAMIN D, TAB 1000IU (100/BT) SCH UNIT: 25 TAB at 10:35

## 2017-11-17 RX ADMIN — ASPIRIN SCH MG: 325 TABLET ORAL at 08:13

## 2017-11-17 RX ADMIN — ENOXAPARIN SODIUM SCH MG: 100 INJECTION SUBCUTANEOUS at 08:14

## 2017-11-17 RX ADMIN — THERA TABS SCH TAB: TAB at 10:35

## 2017-11-17 RX ADMIN — SODIUM CHLORIDE SCH MLS/HR: 9 INJECTION, SOLUTION INTRAVENOUS at 18:28

## 2017-11-17 RX ADMIN — SODIUM CHLORIDE, PRESERVATIVE FREE SCH: 5 INJECTION INTRAVENOUS at 13:51

## 2017-11-17 RX ADMIN — SODIUM CHLORIDE SCH MLS/HR: 9 INJECTION, SOLUTION INTRAVENOUS at 04:08

## 2017-11-17 RX ADMIN — SODIUM CHLORIDE, PRESERVATIVE FREE SCH ML: 5 INJECTION INTRAVENOUS at 22:18

## 2017-11-17 NOTE — PROVIDER PROGRESS NOTE
Subjective





- Prog Note Date


Prog Note Date: 11/17/17





- Subjective


Pt reports feeling: Improved


Subjective: 


pt state he feel much better. no chest pain, headache, abdominal pain.








Current Medications





- Current Medications


Current Medications: 





Active Medications





Acetaminophen (Tylenol)  650 mg PO Q4HR PRN


   PRN Reason: Pain 1 to 4


Aspirin (Abdifatah)  325 mg PO DAILY [facility]


   Last Admin: 11/17/17 08:13 Dose:  325 mg


Cholecalciferol (Vitamin D3)  1,000 unit PO DAILY [facility]


   Last Admin: 11/17/17 10:35 Dose:  1,000 unit


Enoxaparin Sodium (Lovenox)  40 mg SUBQ DAILY [facility]


   Last Admin: 11/17/17 08:14 Dose:  40 mg


Famotidine (Pepcid)  20 mg PO DAILY [facility]


   Last Admin: 11/17/17 08:13 Dose:  20 mg


Sodium Chloride (Normal Saline 0.9%)  1,000 mls @ 85 mls/hr IV .[id number] [facility]


   Last Infusion: 11/17/17 16:00 Dose:  Infused


Multivitamins (Theragran)  1 tab PO DAILY [facility]


   Last Admin: 11/17/17 10:35 Dose:  1 tab


Ondansetron HCl (Zofran Inj)  4 mg IVP Q6HR PRN


   PRN Reason: Nausea / Vomiting


Patient Own Medication (Patient Own Medication)  1 each PO 2100 [facility]


Polyethylene Glycol (Miralax)  17 gm PO DAILY [facility]


   Last Admin: 11/17/17 08:14 Dose:  Not Given


Sodium Chloride (Normal Saline Flush 0.9%)  10 ml IVP PRN PRN


   PRN Reason: AS NEEDED PER PROVIDER ORDERS


Sodium Chloride (Normal Saline Flush 0.9%)  10 ml IVP Q8HR [facility]


   Last Admin: 11/17/17 13:51 Dose:  Not Given





 





Cholecalciferol (Vitamin D3) [Vitamin D3] 1,000 units PO DAILY 11/16/17 


Multivitamin [Theragran] 1 tab PO DAILY 11/16/17 


Simvastatin 20 mg PO 2100 11/17/17 











Objective





- Vital Signs/Intake & Output


Reviewed Vital Signs: Yes


Vital Signs: 


 Vital Signs x48h











  Temp Pulse Pulse Pulse Resp BP BP


 


 11/17/17 15:59  37 C   77   18   142/90 H


 


 11/17/17 12:51  36.9 C   80   16  


 


 11/17/17 10:52   68   69   143/77 H 














  BP BP Pulse Ox


 


 11/17/17 15:59    98


 


 11/17/17 12:51  137/69 H   97


 


 11/17/17 10:52   138/80 H 











Intake & Output: 


 Intake & Output











 11/14/17 11/15/17 11/16/17 11/17/17





 23:59 23:59 23:59 23:59


 


Intake Total   410 2584.75


 


Output Total    875


 


Balance   410 1709.75














- Objective


General Appearance: positive: No acute distress, Alert.  negative: Lethargic


Eyes Bilateral: positive: Normal inspection, PERRL, EOMI, No lid inflammation, 

Conjunctivae nml


ENT: positive: ENT inspection nml, Pharynx nml, No signs of dehydration.  

negative: Purulent nasal drainage, Pharyngeal erythema, Oral lesions, Dry 

mucous membranes


Neck: positive: Nml inspection, Thyroid nml, No JVD, Trachea midline.  negative

: Thyromegaly, Lymphadenopathy (R), Lymphadenopathy (L), Stiff neck, Carotid 

bruit, Swelling/bruising, Tracheal deviation


Respiratory: positive: Chest non-tender, No respiratory distress, Breath sounds 

nml.  negative: Wheezes, Rales, Rhonchi


Cardiovascular: positive: Regular rate & rhythm, No murmur, No gallop.  negative

: Irregularly irregular, Extrasystoles, Tachycardia, Bradycardia, Systolic 

murmur, Diastolic murmur


Peripheral Pulses: 2+ Radial (R), 2+ Radial (L), 2+ Dorsalis pedis (R), 2+ 

Dorsalis pedis (L)


Abdomen: positive: Non-tender, Nml bowel sounds, No distention.  negative: 

Tenderness, Guarding, Rebound


Back: positive: Nml inspection.  negative: CVA tenderness (R), CVA tenderness (L

)


Skin: positive: Color nml, No rash, Warm, Dry.  negative: Cyanosis, Diaphoresis

, Pallor, Skin rash


Extremities: positive: Non-tender, Full ROM, Nml appearance.  negative: Calf 

tenderness, Joint swelling, Ollie's sign/cords


Neurologic/Psychiatric: positive: Oriented x3, Sensation nml, Mood/affect nml, 

Weakness.  negative: Sensory loss, Facial droop, Slurred/abnml speech, 

Depressed mood/affect





- Lab Results


Fish Bones: 


 11/17/17 05:19





 11/17/17 05:19


Other Labs: 


 Lab Results x24hrs











  11/17/17 11/17/17 11/17/17 Range/Units





  05:19 05:19 05:19 


 


WBC    6.4  (4.8-10.8)  x10^3/uL


 


RBC    4.38 L  (4.70-6.10)  10^6/uL


 


Hgb    13.1 L  (14.0-18.0)  g/dL


 


Hct    40.3 L  (42.0-52.0)  %


 


MCV    92.1  (80.0-94.0)  fL


 


MCH    30.0  (27.0-31.0)  pg


 


MCHC    32.6  (32.0-36.0)  g/dL


 


RDW    13.4  (12.0-15.0)  %


 


Plt Count    186  (130-450)  10^3/uL


 


MPV    9.3  (7.4-11.4)  fL


 


Neut #    3.9  (1.5-6.6)  10^3/uL


 


Lymph #    1.7  (1.5-3.5)  10^3/uL


 


Mono #    0.5  (0.0-1.0)  10^3/uL


 


Eos #    0.3  (0.0-0.7)  10^3/uL


 


Baso #    0.0  (0.0-0.1)  10^3/uL


 


Absolute Nucleated RBC    0.00  x10^3/uL


 


Nucleated RBC %    0.1  /100WBC


 


Sodium   137   (135-145)  mmol/L


 


Potassium   3.7   (3.5-5.0)  mmol/L


 


Chloride   106   (101-111)  mmol/L


 


Carbon Dioxide   25   (21-32)  mmol/L


 


Anion Gap   6.0   (6-13)  


 


BUN   13   (6-20)  mg/dL


 


Creatinine   0.9   (0.6-1.2)  mg/dL


 


Estimated GFR (MDRD)   81 L   (>89)  


 


Glucose   93   ()  mg/dL


 


Calcium   8.5   (8.5-10.3)  mg/dL


 


Magnesium   1.8   (1.7-2.8)  mg/dL


 


Total Bilirubin   0.9   (0.2-1.0)  mg/dL


 


AST   20   (10-42)  IU/L


 


ALT   19   (10-60)  IU/L


 


Alkaline Phosphatase   49   ()  IU/L


 


Total Protein   5.5 L   (6.7-8.2)  g/dL


 


Albumin   3.3   (3.2-5.5)  g/dL


 


Globulin   2.2   (2.1-4.2)  g/dL


 


Albumin/Globulin Ratio   1.5   (1.0-2.2)  


 


Triglycerides  58   ( - 149) mg/dL


 


Cholesterol  158   ( - 199) mg/dL


 


LDL Cholesterol, Calc  84   ( - 129) mg/dL


 


VLDL Cholesterol  12    mg/dL


 


HDL Cholesterol  62   (60 - ) mg/dL


 


LDL/HDL Ratio  1.4    (<3.6)  


 


Cholesterol/HDL Ratio  2.5    (<5.0)  














Assessment/Plan





- Problem List


(1) Numbness on right side


Impression: 


(1) Numbness on right side


Conclusion/Plan: 


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 


inform and discuss with pt and his wife


ST evaluate pt, pt is tolerate regular diet


PT/OT evaluate and treat pt, and suggest pt transfer to in-pt rehab


wait the bed for


continue current treatment. 


pt state he can not tolerate Lipitor but he has his own home statin, pt will 

bring his own Statin








pt's symptoms suggest pt may have CVA at left side


MRI


MRA of brain, and neck


ECHO


lipid panel


PT/OT/ST


Aspinin 325mg daily


Lipitor 40 mg daily


NPO until evaluated


allow SBP until 190








(2) Hyperlipidemia


Conclusion/Plan: 


use pt's home Crestor





will check Lipid panel


Lipitor 40 mg daily

## 2017-11-17 NOTE — DISCHARGE PLAN
Discharge Plan for SNF / intermediate





- DC Plan and Transition Orders


Disposition: 03 SNF DC/Xfer


Condition: Stable


SNF Transition Orders: 





Admit to: [[facility]] under the care of [Doctor Name]





Discharge Diagnosis:  


[CVA, hyperlipidemia]





Medicare Certification: I certify that [facility] skilled nursing care is 

medically necessary on a continuing basis for any of the conditions for which 

she/he is receiving care during hospitalization.





 Notify PCP of admission and forward orders to primary provider for signature.





Weight on admission and [72.5 kg].  Call PCP immediately if weight increases by 

[4] pounds or if patient develops dyspnea, chest pain/tightness or edema.





House Bowel Program: [Yes]


If no BM after 2 days, nurse may give M.O.M. 30ml PO PRN and /or ducolax Supp 1 

NV and /or ESTRELLITA 250mg P.O., and/or senna 1-2 tabs PO.  On day 3 nurse may give 

repeat above order until residents constipation is resolved. 





Immunizations:





Annual Influenza Vaccine: [Yes].


(between Sept 1st and March 31st.) Unless allergy or already given





Two-Step PPD: [Yes]


per [facility] [id number] or appropriate documentation of approved exceptions


   


Treatments & Other Orders: [may follow up PCP and neurologist in one week]   





Oxygen Orders: [n]      





Lab Tests or X-Rays Orders: []





Orthopedic Orders: [Remove Sutures/Staples and Comment].








Medications:





PLEASE REFER TO THE DISCHARGE MEDICATION LIST.








Insulin Orders? [No]





Diagnosis: Diabetes


Initiate hypo and hyperglycemia protocols for BG <70 and BG >375.  May check BG 

prn for signs/symptoms of dysglycemia.





Frequency of BG checks: [AC/Meal/HS]





Basal Insulin:


   


   [] Lantus  100 units / ml inject subq as follows: []


   [] Other: []





Correction Insulin: -  Select the type of insulin below





   [Choose: Novolog/Humalog]100 units /ml insulin inject subq per orders 

indicate below














[] LOW DOSE


  [] MODERATE DOSE


  [] MODERATE/HIGH     


       DOSE [] HIGH DOSE


 


 


    GB               UNITS       GB               UNITS         GB             

  UNITS    GB               UNITS


 


 


         0 UNITS          0 UNITS          0 UNITS     

     0 UNITS


 


 


141-175       1 UNITS 141-175       1 UNITS 141-175       2 UNITS 141-175       

3 UNITS


 


 


176-225       2 UNITS 176-225       3 UNITS 176-225       4 UNITS 176-225       

5 UNITS


 


 


226-275       3 UNITS 226-275       5 UNITS 226-275       6 UNITS 226-275       

7 UNITS


 


 


276-325       4 UNITS 276-325       7 UNITS 276-325       8 UNITS 276-325       

9 UNITS


 


 


326-375       5 UNITS 326-375       9 UNITS 326-375      10 UNITS 326-375      

11 UNITS


 


 


>375    CONTACT MD >375    CONTACT MD >375    CONTACT MD >375    CONTACT MD


 











Custom Dosing: 





   [Choose: None/Novolog/Humalog] 100 units/ml Insulin inject subq as follows:











   GB    Units


 


 [] Units


 


141-175 [] Units


 


176-225 [] Units


 


226-275 [] Units


 


276-325 []Units


 


326-375 [] Units


 


>375 Contact MD














Allergies and Adverse Reactions: 


 Allergies











Allergy/AdvReac Type Severity Reaction Status Date / Time


 


No Known Drug Allergies Allergy   Verified 11/16/17 11:38














- Diet


Type: Geriatric


Texture: Regular


Liquids: Thin


May have monthly special meal: Yes





- Therapies | Activity


Therapy: Evaluation | Treat if indicated: Speech, PT, OT, Swallowing / ST


Rehabilitation Potential: Maximize functional status


Activity: Activity as Tolerated


Weight Bearing: Full Weight


Assistance Devices: Walker


Additional Instructions: 


May follow up PCP and neurologist in one week

## 2017-11-17 NOTE — DISCHARGE PLAN
Discharge Plan


Disposition: [facility] DC/Xfer


Condition: Fair


No Smoking: If you smoke, Please STOP!  Call [phone] for help.


Follow-up with: 


Ar Orourke MD [Primary Care Provider] -

## 2017-11-18 LAB
ALBUMIN/GLOB SERPL: 1.5 {RATIO} (ref 1–2.2)
ANION GAP SERPL CALCULATED.4IONS-SCNC: 5 MMOL/L (ref 6–13)
BASOPHILS NFR BLD AUTO: 0 10^3/UL (ref 0–0.1)
BASOPHILS NFR BLD AUTO: 0.7 %
BILIRUB BLD-MCNC: 0.5 MG/DL (ref 0.2–1)
BUN SERPL-MCNC: 14 MG/DL (ref 6–20)
CALCIUM UR-MCNC: 8.4 MG/DL (ref 8.5–10.3)
CHLORIDE SERPL-SCNC: 109 MMOL/L (ref 101–111)
CO2 SERPL-SCNC: 25 MMOL/L (ref 21–32)
CREAT SERPLBLD-SCNC: 0.9 MG/DL (ref 0.6–1.2)
EOSINOPHIL # BLD AUTO: 0.2 10^3/UL (ref 0–0.7)
EOSINOPHIL NFR BLD AUTO: 4.1 %
ERYTHROCYTE [DISTWIDTH] IN BLOOD BY AUTOMATED COUNT: 13.7 % (ref 12–15)
GFRSERPLBLD MDRD-ARVRAT: 81 ML/MIN/{1.73_M2} (ref 89–?)
GLOBULIN SER-MCNC: 2.1 G/DL (ref 2.1–4.2)
GLUCOSE SERPL-MCNC: 96 MG/DL (ref 70–100)
HCT VFR BLD AUTO: 37.9 % (ref 42–52)
HGB UR QL STRIP: 12.6 G/DL (ref 14–18)
LYMPHOCYTES # SPEC AUTO: 1.6 10^3/UL (ref 1.5–3.5)
LYMPHOCYTES NFR BLD AUTO: 28.7 %
MCH RBC QN AUTO: 30.5 PG (ref 27–31)
MCHC RBC AUTO-ENTMCNC: 33.2 G/DL (ref 32–36)
MCV RBC AUTO: 91.9 FL (ref 80–94)
MONOCYTES # BLD AUTO: 0.4 10^3/UL (ref 0–1)
MONOCYTES NFR BLD AUTO: 8 %
NEUTROPHILS # BLD AUTO: 3.2 10^3/UL (ref 1.5–6.6)
NEUTROPHILS # SNV AUTO: 5.4 X10^3/UL (ref 4.8–10.8)
NEUTROPHILS NFR BLD AUTO: 58.5 %
NRBC # BLD AUTO: 0 /100WBC
PDW BLD AUTO: 9.6 FL (ref 7.4–11.4)
POTASSIUM SERPL-SCNC: 3.5 MMOL/L (ref 3.5–5)
PROT SPEC-MCNC: 5.3 G/DL (ref 6.7–8.2)
RBC MAR: 4.12 10^6/UL (ref 4.7–6.1)
SODIUM SERPLBLD-SCNC: 139 MMOL/L (ref 135–145)
WBC # BLD: 5.4 X10^3/UL

## 2017-11-18 RX ADMIN — POLYETHYLENE GLYCOL 3350 SCH: 17 POWDER, FOR SOLUTION ORAL at 08:28

## 2017-11-18 RX ADMIN — FAMOTIDINE SCH MG: 20 TABLET, FILM COATED ORAL at 09:21

## 2017-11-18 RX ADMIN — SODIUM CHLORIDE SCH MLS/HR: 9 INJECTION, SOLUTION INTRAVENOUS at 06:04

## 2017-11-18 RX ADMIN — ENOXAPARIN SODIUM SCH MG: 100 INJECTION SUBCUTANEOUS at 09:21

## 2017-11-18 RX ADMIN — SODIUM CHLORIDE SCH MLS/HR: 9 INJECTION, SOLUTION INTRAVENOUS at 18:01

## 2017-11-18 RX ADMIN — DILTIAZEM HYDROCHLORIDE SCH EACH: 120 CAPSULE, COATED, EXTENDED RELEASE ORAL at 20:42

## 2017-11-18 RX ADMIN — SODIUM CHLORIDE, PRESERVATIVE FREE SCH: 5 INJECTION INTRAVENOUS at 20:43

## 2017-11-18 RX ADMIN — SODIUM CHLORIDE, PRESERVATIVE FREE SCH: 5 INJECTION INTRAVENOUS at 05:45

## 2017-11-18 RX ADMIN — ASPIRIN SCH MG: 325 TABLET ORAL at 09:21

## 2017-11-18 RX ADMIN — SODIUM CHLORIDE, PRESERVATIVE FREE SCH: 5 INJECTION INTRAVENOUS at 13:37

## 2017-11-18 RX ADMIN — VITAMIN D, TAB 1000IU (100/BT) SCH UNIT: 25 TAB at 09:21

## 2017-11-18 RX ADMIN — THERA TABS SCH TAB: TAB at 09:21

## 2017-11-18 NOTE — PROVIDER PROGRESS NOTE
Subjective





- Prog Note Date


Prog Note Date: 11/18/17





- Subjective


Pt reports feeling: Improved


Subjective: 


pt state he has great improvement for gait balance. I go with pt. Pt did have 

great improvement for walk independently.


wait for pt's insurance improve, then pt can be transferred to [facility] 

inpatient rehab 








Current Medications





- Current Medications


Current Medications: 





Active Medications





Acetaminophen (Tylenol)  650 mg PO Q4HR PRN


   PRN Reason: Pain 1 to 4


Aspirin (Abdifatah)  325 mg PO DAILY [facility]


   Last Admin: 11/18/17 09:21 Dose:  325 mg


Cholecalciferol (Vitamin D3)  1,000 unit PO DAILY [facility]


   Last Admin: 11/18/17 09:21 Dose:  1,000 unit


Enoxaparin Sodium (Lovenox)  40 mg SUBQ DAILY [facility]


   Last Admin: 11/18/17 09:21 Dose:  40 mg


Famotidine (Pepcid)  20 mg PO DAILY [facility]


   Last Admin: 11/18/17 09:21 Dose:  20 mg


Sodium Chloride (Normal Saline 0.9%)  1,000 mls @ 85 mls/hr IV .[id number] [facility]


   Last Admin: 11/18/17 06:04 Dose:  85 mls/hr


Multivitamins (Theragran)  1 tab PO DAILY [facility]


   Last Admin: 11/18/17 09:21 Dose:  1 tab


Ondansetron HCl (Zofran Inj)  4 mg IVP Q6HR PRN


   PRN Reason: Nausea / Vomiting


Patient Own Medication (Patient Own Medication)  1 each PO 2100 [facility]


Polyethylene Glycol (Miralax)  17 gm PO DAILY [facility]


   Last Admin: 11/18/17 08:28 Dose:  Not Given


Sodium Chloride (Normal Saline Flush 0.9%)  10 ml IVP PRN PRN


   PRN Reason: AS NEEDED PER PROVIDER ORDERS


Sodium Chloride (Normal Saline Flush 0.9%)  10 ml IVP Q8HR [facility]


   Last Admin: 11/18/17 13:37 Dose:  Not Given





 





Cholecalciferol (Vitamin D3) [Vitamin D3] 1,000 units PO DAILY 11/16/17 


Multivitamin [Theragran] 1 tab PO DAILY 11/16/17 


Simvastatin 20 mg PO 2100 11/17/17 











Objective





- Vital Signs/Intake & Output


Vital Signs: 


 Vital Signs x48h











  Temp Pulse Pulse Resp BP BP Pulse Ox


 


 11/18/17 13:00  36.8 C  82   19  146/76 H   99


 


 11/18/17 10:30    93    134/77 H 














- Objective


General Appearance: positive: No acute distress, Alert.  negative: Lethargic


Eyes Bilateral: positive: Normal inspection, PERRL, No lid inflammation, 

Conjunctivae nml


ENT: positive: ENT inspection nml, Pharynx nml, No signs of dehydration.  

negative: Purulent nasal drainage, Pharyngeal erythema, Oral lesions, Dry 

mucous membranes


Neck: positive: Nml inspection, Thyroid nml, No JVD, Trachea midline.  negative

: Thyromegaly, Lymphadenopathy (R), Lymphadenopathy (L), Stiff neck, Carotid 

bruit, Swelling/bruising, Tracheal deviation


Respiratory: positive: Chest non-tender, No respiratory distress, Breath sounds 

nml.  negative: Wheezes, Rales, Rhonchi


Cardiovascular: positive: Regular rate & rhythm, No murmur, No gallop.  negative

: Irregularly irregular, Extrasystoles, Tachycardia, Bradycardia, Systolic 

murmur, Diastolic murmur


Peripheral Pulses: 2+ Radial (R), 2+ Radial (L), 2+ Dorsalis pedis (R), 2+ 

Dorsalis pedis (L)


Abdomen: positive: Non-tender, No organomegaly, Nml bowel sounds, No 

distention.  negative: Tenderness, Guarding, Rebound


Back: positive: Nml inspection.  negative: CVA tenderness (R), CVA tenderness (L

)


Skin: positive: Color nml, No rash, Warm, Dry.  negative: Cyanosis, Diaphoresis

, Pallor


Extremities: positive: Non-tender, Full ROM, Nml appearance.  negative: Calf 

tenderness, Joint swelling, Ollie's sign/cords


Neurologic/Psychiatric: positive: Oriented x3, Sensation nml, Mood/affect nml, 

Other (pt's gait imbalance isuue is great improved).  negative: Weakness, 

Sensory loss, Facial droop, Slurred/abnml speech, Depressed mood/affect





- Lab Results


Fish Bones: 


 11/18/17 05:42





 11/18/17 05:42





Assessment/Plan





- Problem List


(1) Numbness on right side


Impression: 


CVA


Conclusion/Plan: 


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 


inform and discuss with pt and his wife


ST evaluate pt, pt is tolerate regular diet


PT/OT evaluate and treat pt, and suggest pt transfer to in-pt rehab


wait the bed for


continue current treatment. 


pt state he can not tolerate Lipitor but he has his own home statin, pt will 

bring his own Statin








pt's symptoms suggest pt may have CVA at left side


MRI


MRA of brain, and neck


ECHO


lipid panel


PT/OT/ST


Aspinin 325mg daily


Lipitor 40 mg daily


NPO until evaluated


allow SBP until 190








(2) Hyperlipidemia


Conclusion/Plan: 


pt state he can not tolerate Lipid before, and prefer home Crestor


use pt's home Crestor





will check Lipid panel


Lipitor 40 mg daily





after pt's insurance improved[facility]

## 2017-11-19 LAB
ALBUMIN/GLOB SERPL: 1.4 {RATIO} (ref 1–2.2)
ANION GAP SERPL CALCULATED.4IONS-SCNC: 8 MMOL/L (ref 6–13)
BASOPHILS NFR BLD AUTO: 0 10^3/UL (ref 0–0.1)
BASOPHILS NFR BLD AUTO: 0.7 %
BILIRUB BLD-MCNC: 0.6 MG/DL (ref 0.2–1)
BUN SERPL-MCNC: 14 MG/DL (ref 6–20)
CALCIUM UR-MCNC: 8.5 MG/DL (ref 8.5–10.3)
CHLORIDE SERPL-SCNC: 107 MMOL/L (ref 101–111)
CO2 SERPL-SCNC: 24 MMOL/L (ref 21–32)
CREAT SERPLBLD-SCNC: 0.9 MG/DL (ref 0.6–1.2)
EOSINOPHIL # BLD AUTO: 0.3 10^3/UL (ref 0–0.7)
EOSINOPHIL NFR BLD AUTO: 4.6 %
ERYTHROCYTE [DISTWIDTH] IN BLOOD BY AUTOMATED COUNT: 13.3 % (ref 12–15)
GFRSERPLBLD MDRD-ARVRAT: 81 ML/MIN/{1.73_M2} (ref 89–?)
GLOBULIN SER-MCNC: 2.2 G/DL (ref 2.1–4.2)
GLUCOSE SERPL-MCNC: 97 MG/DL (ref 70–100)
HCT VFR BLD AUTO: 37.5 % (ref 42–52)
HGB UR QL STRIP: 12.4 G/DL (ref 14–18)
LYMPHOCYTES # SPEC AUTO: 1.8 10^3/UL (ref 1.5–3.5)
LYMPHOCYTES NFR BLD AUTO: 32.2 %
MCH RBC QN AUTO: 30.2 PG (ref 27–31)
MCHC RBC AUTO-ENTMCNC: 33.1 G/DL (ref 32–36)
MCV RBC AUTO: 91.4 FL (ref 80–94)
MONOCYTES # BLD AUTO: 0.4 10^3/UL (ref 0–1)
MONOCYTES NFR BLD AUTO: 7.8 %
NEUTROPHILS # BLD AUTO: 3 10^3/UL (ref 1.5–6.6)
NEUTROPHILS # SNV AUTO: 5.5 X10^3/UL (ref 4.8–10.8)
NEUTROPHILS NFR BLD AUTO: 54.7 %
NRBC # BLD AUTO: 0 /100WBC
PDW BLD AUTO: 9.5 FL (ref 7.4–11.4)
POTASSIUM SERPL-SCNC: 3.6 MMOL/L (ref 3.5–5)
PROT SPEC-MCNC: 5.3 G/DL (ref 6.7–8.2)
RBC MAR: 4.11 10^6/UL (ref 4.7–6.1)
SODIUM SERPLBLD-SCNC: 139 MMOL/L (ref 135–145)
WBC # BLD: 5.5 X10^3/UL

## 2017-11-19 RX ADMIN — DILTIAZEM HYDROCHLORIDE SCH EACH: 120 CAPSULE, COATED, EXTENDED RELEASE ORAL at 20:23

## 2017-11-19 RX ADMIN — THERA TABS SCH TAB: TAB at 08:31

## 2017-11-19 RX ADMIN — VITAMIN D, TAB 1000IU (100/BT) SCH UNIT: 25 TAB at 08:31

## 2017-11-19 RX ADMIN — ASPIRIN SCH MG: 325 TABLET ORAL at 08:31

## 2017-11-19 RX ADMIN — POLYETHYLENE GLYCOL 3350 SCH: 17 POWDER, FOR SOLUTION ORAL at 08:33

## 2017-11-19 RX ADMIN — SODIUM CHLORIDE, PRESERVATIVE FREE SCH ML: 5 INJECTION INTRAVENOUS at 14:11

## 2017-11-19 RX ADMIN — SODIUM CHLORIDE, PRESERVATIVE FREE SCH: 5 INJECTION INTRAVENOUS at 05:08

## 2017-11-19 RX ADMIN — ENOXAPARIN SODIUM SCH MG: 100 INJECTION SUBCUTANEOUS at 08:31

## 2017-11-19 RX ADMIN — FAMOTIDINE SCH MG: 20 TABLET, FILM COATED ORAL at 08:30

## 2017-11-19 RX ADMIN — SODIUM CHLORIDE, PRESERVATIVE FREE SCH ML: 5 INJECTION INTRAVENOUS at 20:26

## 2017-11-19 RX ADMIN — SODIUM CHLORIDE SCH MLS/HR: 9 INJECTION, SOLUTION INTRAVENOUS at 05:50

## 2017-11-19 NOTE — PROVIDER PROGRESS NOTE
Objective





- Vital Signs/Intake & Output


Vital Signs: 


 Vital Signs x48h











  Temp Pulse Resp BP Pulse Ox


 


 11/19/17 13:00  36.7 C  77  20  154/79 H  100


 


 11/19/17 10:00  36.8 C  70  16  139/72 H  100


 


 11/19/17 07:40  36.8 C  63  19  142/75 H  99











Intake & Output: 


 Intake & Output











 11/16/17 11/17/17 11/18/17 11/19/17





 23:59 23:59 23:59 23:59


 


Intake Total   1450 1541


 


Balance   1450 1541














- Lab Results


Fish Bones: 


 11/19/17 05:08





 11/19/17 05:08


Other Labs: 


 Lab Results x24hrs











  11/19/17 11/19/17 Range/Units





  05:08 05:08 


 


WBC   5.5  (4.8-10.8)  x10^3/uL


 


RBC   4.11 L  (4.70-6.10)  10^6/uL


 


Hgb   12.4 L  (14.0-18.0)  g/dL


 


Hct   37.5 L  (42.0-52.0)  %


 


MCV   91.4  (80.0-94.0)  fL


 


MCH   30.2  (27.0-31.0)  pg


 


MCHC   33.1  (32.0-36.0)  g/dL


 


RDW   13.3  (12.0-15.0)  %


 


Plt Count   179  (130-450)  10^3/uL


 


MPV   9.5  (7.4-11.4)  fL


 


Neut #   3.0  (1.5-6.6)  10^3/uL


 


Lymph #   1.8  (1.5-3.5)  10^3/uL


 


Mono #   0.4  (0.0-1.0)  10^3/uL


 


Eos #   0.3  (0.0-0.7)  10^3/uL


 


Baso #   0.0  (0.0-0.1)  10^3/uL


 


Absolute Nucleated RBC   0.00  x10^3/uL


 


Nucleated RBC %   0.0  /100WBC


 


Sodium  139   (135-145)  mmol/L


 


Potassium  3.6   (3.5-5.0)  mmol/L


 


Chloride  107   (101-111)  mmol/L


 


Carbon Dioxide  24   (21-32)  mmol/L


 


Anion Gap  8.0   (6-13)  


 


BUN  14   (6-20)  mg/dL


 


Creatinine  0.9   (0.6-1.2)  mg/dL


 


Estimated GFR (MDRD)  81 L   (>89)  


 


Glucose  97   ()  mg/dL


 


Calcium  8.5   (8.5-10.3)  mg/dL


 


Total Bilirubin  0.6   (0.2-1.0)  mg/dL


 


AST  17   (10-42)  IU/L


 


ALT  16   (10-60)  IU/L


 


Alkaline Phosphatase  49   ()  IU/L


 


Total Protein  5.3 L   (6.7-8.2)  g/dL


 


Albumin  3.1 L   (3.2-5.5)  g/dL


 


Globulin  2.2   (2.1-4.2)  g/dL


 


Albumin/Globulin Ratio  1.4   (1.0-2.2)  














Assessment/Plan





- Problem List


(1) Numbness on right side


Impression: 


(1) Numbness on right side


Impression: 


pt report it is much improved but there is still has mild number at right side


continue Aspirin and Statin treatment


continue neuro check, PT/OT/ST








2.CVA


Conclusion/Plan: 


pt's gait, speech and sensation are much improved


continue current medical treatment





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 


inform and discuss with pt and his wife


ST evaluate pt, pt is tolerate regular diet


PT/OT evaluate and treat pt, and suggest pt transfer to in-pt rehab


wait the bed for


continue current treatment. 


pt state he can not tolerate Lipitor but he has his own home statin, pt will 

bring his own Statin








pt's symptoms suggest pt may have CVA at left side


MRI


MRA of brain, and neck


ECHO


lipid panel


PT/OT/ST


Aspinin 325mg daily


Lipitor 40 mg daily


NPO until evaluated


allow SBP until 190








(3) Hyperlipidemia


Conclusion/Plan: 


pt state he can not tolerate Lipid before, and prefer home Crestor


use pt's home Crestor





will check Lipid panel


Lipitor 40 mg daily





after pt's insurance improved[facility]





(4) gait imbalance


pt's gait is well improved but still require more training.


continue current medical treatment


continue PT/OT

## 2017-11-20 LAB
BASOPHILS NFR BLD AUTO: 0 10^3/UL (ref 0–0.1)
BASOPHILS NFR BLD AUTO: 0.8 %
EOSINOPHIL # BLD AUTO: 0.2 10^3/UL (ref 0–0.7)
EOSINOPHIL NFR BLD AUTO: 5.2 %
ERYTHROCYTE [DISTWIDTH] IN BLOOD BY AUTOMATED COUNT: 13.2 % (ref 12–15)
HCT VFR BLD AUTO: 36.9 % (ref 42–52)
HGB UR QL STRIP: 12.4 G/DL (ref 14–18)
LYMPHOCYTES # SPEC AUTO: 1.6 10^3/UL (ref 1.5–3.5)
LYMPHOCYTES NFR BLD AUTO: 34.8 %
MCH RBC QN AUTO: 30.4 PG (ref 27–31)
MCHC RBC AUTO-ENTMCNC: 33.5 G/DL (ref 32–36)
MCV RBC AUTO: 90.8 FL (ref 80–94)
MONOCYTES # BLD AUTO: 0.3 10^3/UL (ref 0–1)
MONOCYTES NFR BLD AUTO: 7.4 %
NEUTROPHILS # BLD AUTO: 2.4 10^3/UL (ref 1.5–6.6)
NEUTROPHILS # SNV AUTO: 4.7 X10^3/UL (ref 4.8–10.8)
NEUTROPHILS NFR BLD AUTO: 51.8 %
NRBC # BLD AUTO: 0 /100WBC
PDW BLD AUTO: 9.4 FL (ref 7.4–11.4)
RBC MAR: 4.06 10^6/UL (ref 4.7–6.1)
WBC # BLD: 4.7 X10^3/UL

## 2017-11-20 RX ADMIN — SODIUM CHLORIDE, PRESERVATIVE FREE SCH ML: 5 INJECTION INTRAVENOUS at 07:00

## 2017-11-20 RX ADMIN — POLYETHYLENE GLYCOL 3350 SCH: 17 POWDER, FOR SOLUTION ORAL at 09:11

## 2017-11-20 RX ADMIN — FAMOTIDINE SCH MG: 20 TABLET, FILM COATED ORAL at 09:07

## 2017-11-20 RX ADMIN — ENOXAPARIN SODIUM SCH MG: 100 INJECTION SUBCUTANEOUS at 09:07

## 2017-11-20 RX ADMIN — VITAMIN D, TAB 1000IU (100/BT) SCH UNIT: 25 TAB at 09:08

## 2017-11-20 RX ADMIN — SODIUM CHLORIDE, PRESERVATIVE FREE SCH ML: 5 INJECTION INTRAVENOUS at 20:39

## 2017-11-20 RX ADMIN — THERA TABS SCH TAB: TAB at 09:08

## 2017-11-20 RX ADMIN — SODIUM CHLORIDE, PRESERVATIVE FREE SCH ML: 5 INJECTION INTRAVENOUS at 15:04

## 2017-11-20 RX ADMIN — DILTIAZEM HYDROCHLORIDE SCH EACH: 120 CAPSULE, COATED, EXTENDED RELEASE ORAL at 20:36

## 2017-11-20 RX ADMIN — ASPIRIN SCH MG: 325 TABLET ORAL at 09:08

## 2017-11-20 NOTE — PROVIDER PROGRESS NOTE
Subjective





- Prog Note Date


Prog Note Date: 11/20/17





- Subjective


Pt reports feeling: Improved


Subjective: 


pt report he is doing well, continue recovery from CVA. pt is waiting for 

insurance prove to [facility] rehab








Current Medications





- Current Medications


Current Medications: 





Active Medications





Acetaminophen (Tylenol)  650 mg PO Q4HR PRN


   PRN Reason: Pain 1 to 4


Aspirin (Abdifatah)  325 mg PO DAILY [facility]


   Last Admin: 11/20/17 09:08 Dose:  325 mg


Cholecalciferol (Vitamin D3)  1,000 unit PO DAILY [facility]


   Last Admin: 11/20/17 09:08 Dose:  1,000 unit


Enoxaparin Sodium (Lovenox)  40 mg SUBQ DAILY [facility]


   Last Admin: 11/20/17 09:07 Dose:  40 mg


Famotidine (Pepcid)  20 mg PO DAILY [facility]


   Last Admin: 11/20/17 09:07 Dose:  20 mg


Multivitamins (Theragran)  1 tab PO DAILY [facility]


   Last Admin: 11/20/17 09:08 Dose:  1 tab


Ondansetron HCl (Zofran Inj)  4 mg IVP Q6HR PRN


   PRN Reason: Nausea / Vomiting


Simvastatin 20mg Tab  1 each PO 2100 [facility]


   Last Admin: 11/20/17 20:36 Dose:  1 each


Polyethylene Glycol (Miralax)  17 gm PO DAILY [facility]


   Last Admin: 11/20/17 09:11 Dose:  Not Given


Sodium Chloride (Normal Saline Flush 0.9%)  10 ml IVP PRN PRN


   PRN Reason: AS NEEDED PER PROVIDER ORDERS


   Last Admin: 11/19/17 08:44 Dose:  10 ml


Sodium Chloride (Normal Saline Flush 0.9%)  10 ml IVP Q8HR [facility]


   Last Admin: 11/20/17 20:39 Dose:  10 ml





 





Cholecalciferol (Vitamin D3) [Vitamin D3] 1,000 units PO DAILY 11/16/17 


Multivitamin [Theragran] 1 tab PO DAILY 11/16/17 


Simvastatin 20 mg PO 2100 11/17/17 











Objective





- Vital Signs/Intake & Output


Reviewed Vital Signs: Yes


Vital Signs: 


 Vital Signs x48h











  Temp Pulse Pulse Pulse Pulse Resp BP


 


 11/20/17 15:27  36.8 C   75    18 


 


 11/20/17 13:00  36.7 C   65    16 


 


 11/20/17 11:22   68   93  69   143/77 H














  BP BP BP Pulse Ox


 


 11/20/17 15:27  132/72 H    98


 


 11/20/17 13:00  135/71 H    98


 


 11/20/17 11:22   134/77 H  138/80 H 











Intake & Output: 


 Intake & Output











 11/17/17 11/18/17 11/19/17 11/20/17





 23:59 23:59 23:59 23:59


 


Intake Total  1450 2281 720


 


Balance  1450 2281 720














- Objective


General Appearance: positive: No acute distress, Alert.  negative: Lethargic


Eyes Bilateral: positive: Normal inspection, PERRL.  negative: No lid 

inflammation, Conjunctivae nml


ENT: positive: ENT inspection nml, Pharynx nml, No signs of dehydration.  

negative: Purulent nasal drainage, Pharyngeal erythema, Oral lesions


Neck: positive: Nml inspection, Thyroid nml, No JVD, Trachea midline.  negative

: Thyromegaly, Lymphadenopathy (R), Lymphadenopathy (L), Stiff neck, Carotid 

bruit, Swelling/bruising, Tracheal deviation


Respiratory: positive: Chest non-tender, No respiratory distress, Breath sounds 

nml.  negative: Wheezes, Rales, Rhonchi


Cardiovascular: positive: Regular rate & rhythm, No murmur, No gallop.  negative

: Irregularly irregular, Extrasystoles, Tachycardia, Bradycardia, Systolic 

murmur, Diastolic murmur


Peripheral Pulses: 2+ Radial (R), 2+ Radial (L), 2+ Dorsalis pedis (R), 2+ 

Dorsalis pedis (L)


Abdomen: positive: Non-tender, No organomegaly, Nml bowel sounds, No 

distention.  negative: Tenderness, Guarding, Rebound


Back: positive: Nml inspection.  negative: CVA tenderness (R), CVA tenderness (L

)


Skin: positive: Color nml, No rash, Warm, Dry.  negative: Cyanosis, Diaphoresis

, Pallor, Skin rash


Extremities: positive: Non-tender, Full ROM, Nml appearance.  negative: Calf 

tenderness, Joint swelling, Ollie's sign/cords


Neurologic/Psychiatric: positive: Oriented x3, Mood/affect nml.  negative: 

Sensory loss, Facial droop, Slurred/abnml speech, Depressed mood/affect





- Lab Results


Fish Bones: 


 11/20/17 05:22





 11/19/17 05:08


Other Labs: 


 Lab Results x24hrs











  11/20/17 Range/Units





  05:22 


 


WBC  4.7 L  (4.8-10.8)  x10^3/uL


 


RBC  4.06 L  (4.70-6.10)  10^6/uL


 


Hgb  12.4 L  (14.0-18.0)  g/dL


 


Hct  36.9 L  (42.0-52.0)  %


 


MCV  90.8  (80.0-94.0)  fL


 


MCH  30.4  (27.0-31.0)  pg


 


MCHC  33.5  (32.0-36.0)  g/dL


 


RDW  13.2  (12.0-15.0)  %


 


Plt Count  173  (130-450)  10^3/uL


 


MPV  9.4  (7.4-11.4)  fL


 


Neut #  2.4  (1.5-6.6)  10^3/uL


 


Lymph #  1.6  (1.5-3.5)  10^3/uL


 


Mono #  0.3  (0.0-1.0)  10^3/uL


 


Eos #  0.2  (0.0-0.7)  10^3/uL


 


Baso #  0.0  (0.0-0.1)  10^3/uL


 


Absolute Nucleated RBC  0.00  x10^3/uL


 


Nucleated RBC %  0.0  /100WBC














Assessment/Plan





- Problem List


(1) Numbness on right side


Impression: 





Impression: 


slight to have sensation of numbness


continue PT/OT





pt report it is much improved but there is still has mild number at right side


continue Aspirin and Statin treatment


continue neuro check, PT/OT/ST








2.CVA


Conclusion/Plan: 


gait, speech and sensation continue to improve


wait for insurance proved 


continue PT/OT/ST





pt's gait, speech and sensation are much improved


continue current medical treatment





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 


inform and discuss with pt and his wife


ST evaluate pt, pt is tolerate regular diet


PT/OT evaluate and treat pt, and suggest pt transfer to in-pt rehab


wait the bed for


continue current treatment. 


pt state he can not tolerate Lipitor but he has his own home statin, pt will 

bring his own Statin








pt's symptoms suggest pt may have CVA at left side


MRI


MRA of brain, and neck


ECHO


lipid panel


PT/OT/ST


Aspinin 325mg daily


Lipitor 40 mg daily


NPO until evaluated


allow SBP until 190








(3) Hyperlipidemia


Conclusion/Plan: 


pt state he can not tolerate Lipid before, and prefer home Crestor


use pt's home Crestor





will check Lipid panel


Lipitor 40 mg daily





after pt's insurance improved, [facility]





(4) gait imbalance


much improved


continue PT/OT





pt's gait is well improved but still require more training.


continue current medical treatment


continue PT/OT

## 2017-11-21 VITALS — DIASTOLIC BLOOD PRESSURE: 77 MMHG | SYSTOLIC BLOOD PRESSURE: 143 MMHG

## 2017-11-21 RX ADMIN — ASPIRIN SCH MG: 325 TABLET ORAL at 09:11

## 2017-11-21 RX ADMIN — POLYETHYLENE GLYCOL 3350 SCH: 17 POWDER, FOR SOLUTION ORAL at 09:07

## 2017-11-21 RX ADMIN — SODIUM CHLORIDE, PRESERVATIVE FREE SCH ML: 5 INJECTION INTRAVENOUS at 05:58

## 2017-11-21 RX ADMIN — FAMOTIDINE SCH MG: 20 TABLET, FILM COATED ORAL at 09:11

## 2017-11-21 RX ADMIN — ENOXAPARIN SODIUM SCH MG: 100 INJECTION SUBCUTANEOUS at 09:10

## 2017-11-21 RX ADMIN — VITAMIN D, TAB 1000IU (100/BT) SCH UNIT: 25 TAB at 09:11

## 2017-11-21 RX ADMIN — THERA TABS SCH TAB: TAB at 09:11

## 2017-11-21 NOTE — DISCHARGE SUMMARY
Discharge Summary


Admit Date: 11/16/17


Discharge Date: 11/21/17


Discharging Provider: THADDEUS Garrett


Primary Care Provider: Dr. Ar Orourke


Code Status: Attempt Resuscitation


Condition at Discharge: Good


Discharge Disposition: 01 Home, Self Care





- DIAGNOSES


Admission Diagnoses: 





CVA (cerebral vascular accident) (I63.9) 


Numbness on right side (R20.0) 


Slow rate of speech (R47.89) 


Hyperlipidemia (E78.5) 


Unstable gait (R26.81)


Discharge Diagnoses with Status of Each Condition: 


CVA (cerebral vascular accident) (I63.9)- MRI confirms subacute patchy left PCA 

territory ischemic stroke. No hemorrhage or mass effect.  Moderate generalized 

cortical atrophy with marked compensatory ventricular enlargement. Moderate 

midbrain atrophy. No significant white matter disease present per report.  Plan

: Patient will remain on ASA and statin therapy.  He should review his 

hospitalization with PCP and decide if a formal neurology consult on an 

outpatient basis is warranted. 


 


Numbness on right side (R20.0)- presenting symptoms included mild arm/leg 

weakness, but primarily right facial numbness and slight AMS per wife report.  

An MRI confirmed a subacute left PCA territory ischemic stroke.  Plan:  PT/OT 

ordered for out patient recommendations.  











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 

improved throughout hospital stay.  Plan:  Consider speech therapy if this 

changes.  Stable at the time of discharge.





Hyperlipidemia (E78.5)- Lipid panel results showed grossly normal results LDL/

HDL ratio was just 1.4. Plan: continue on statins life long is recommended.





Unstable gait (R26.81)-This was an acute problem upon admission likely a 

consequence of CVA, but has resolved at the time of discharge.  Plan:  Continue 

to take precautions in home environment to prevent falls/injury.  Wife involved 

with HOME care plan.





- HPI


History of Present Illness: 


HPI per Garo Benjamin, 


This is a pleasure 82-year-old  male with HLP past medical history, 

otherwise health, who present ER for evaluation of focal neurological deficit. 

Pt report on last night about 9pm he began to feel numbness on right side upper 

and lower extremities, and right face. Then pt though it would go away, and 

went to sleep. In the today morning, pt still felt right side numbness, and 

with slower speech than usually and mild gait imbalance. Pt's wife felt it is 

unusual then bring pt to ER. Pt still present all these symptoms in the ER. Pt 

denies headache, any injury, chest pain, SOB, fever, chill, abdominal pain, 

nausea, vomiting, vision changing. CT of head reveals unremarkable. Lab test is 

unremarkable as well.


pt was admitted for CVA evaluation.





- HOSPITAL COURSE


Hospital Course: 


Patient noted numbness feeling in his right arm, leg, and on his face that 

started last evening.  His wife also thought that he was having trouble with 

thought process.  He had onset of the symptoms yesterday evening and last 

night.  He was not sure if the numbness was still there when he first awoke 

this morning but noted it particularly around 10 AM. There is no weakness 

noted.  He was slightly off balance walking.  He denied any headache or head 

injury.  An MRI revealed a subacute CVA at left PCAST.  PT/OT evaluated, who 

suggest rehab as the next step in recovery.  There were issues with obtaining a 

bed, so the patient discharge was delayed.  This continued until the time of 

discharge and as the patient became more improved, he eventually just went home 

with his wife with plans to have continued PT/OT and take newly prescribed 

medications.  Both patient and wife were in agreement, so patient left via 

private car back to home with family support.





- ALLERGIES


Allergies/Adverse Reactions: 


 Allergies











Allergy/AdvReac Type Severity Reaction Status Date / Time


 


No Known Drug Allergies Allergy   Verified 11/16/17 11:38














- MEDICATIONS


Home Medications: 


 Ambulatory Orders











 Medication  Instructions  Recorded  Confirmed


 


Cholecalciferol (Vitamin D3) 1,000 units PO DAILY 11/16/17 11/16/17





[Vitamin D3]   


 


Multivitamin [Theragran] 1 tab PO DAILY 11/16/17 11/16/17


 


Aspirin [Abdifatah] 325 mg PO DAILY  tablet 11/17/17 


 


Famotidine [Pepcid] 20 mg PO DAILY  tablet 11/17/17 


 


Simvastatin 20 mg PO 2100 11/17/17 11/17/17














- PHYSICAL EXAM AT DISCHARGE


General Appearance: positive: No acute distress, Alert


Eyes Bilateral: positive: Normal inspection, PERRL


ENT: positive: ENT inspection nml, Pharynx nml


Neck: positive: Nml inspection, Thyroid nml, No JVD, Trachea midline


Respiratory: positive: Chest non-tender, No respiratory distress, Breath sounds 

nml


Cardiovascular: positive: Regular rate & rhythm, No murmur, No gallop


Peripheral Pulses: positive: 2+


Abdomen: positive: Non-tender, No organomegaly, Nml bowel sounds, No distention


Back: positive: Nml inspection


Skin: positive: Color nml, No rash, Warm, Dry


Extremities: positive: Non-tender, Full ROM, Nml appearance, No pedal edema


Neurologic/Psychiatric: positive: Oriented x3, Motor nml, Sensation nml, Mood/

affect nml


Reflexes: Bicep (R): 3+, Bicep (L): 3+





- LABS


Result Diagrams: 


 11/20/17 05:22





 11/19/17 05:08





- DIAGNOSTIC IMAGING


Diagnostic Imaging Results: Final report reviewed


Diagnostic Imaging Results Comments: 





MRI brain 11/16/17:  Impressions: 1. Subacute patchy left PCA territory 

ischemic stroke. No hemorrhage or mass effect. 2. Moderate generalized cortical 

atrophy with marked compensatory ventricular enlargement. Moderate midbrain 

atrophy. No significant white matter disease present. 





Head CT 11/16/17:  IMPRESSION: No CT evidence for acute intracranial 

abnormality. 





Head CT angio 11/16/17: IMPRESSION: Proximal left PCA P2 segment occlusion. No 

other evidence for proximal intracranial large artery flow-limiting stenosis or 

occlusion.





Neck CT angio 11/16/17: IMPRESSION: No acute abnormality or significant 

stenosis of the cervical vertebral carotid arteries.

















- FOLLOW UP


Follow Up: 





Results of your head MRI shows a left territory ischemic stroke- which explains 

your right sided facial numbness.





Rest at home and increase activity slowly.





Please see your PCP by the end of this week as a follow up to this hospital 

stay.





See neurologist if indicated by PCP.





Complete outpatient therapy as ordered.





- TIME SPENT


Time Spent in Discharge (Minutes): 60

## 2017-11-21 NOTE — DISCHARGE PLAN
Discharge Plan


Disposition: 02 Transfer Acute Care Hosp


Condition: Good


Diet: Regular


Activity Restrictions: Activity as Tolerated


Shower Restrictions: No


Driving Restrictions: No


Assistance Devices: Walker


Weight Bearing: Full Weight


Additional Instructions or Follow Up instructions: 


Results of your head MRI shows a left territory ischemic stroke- which explains 

your right sided facial numbness.





Rest at home and increase activity slowly.





Please see your PCP by the end of this week as a follow up to this hospital 

stay.





See neurologist if indicated by PCP.





Complete outpatient therapy as ordered.








Follow-Up Care: Outpatient Rehab - PT, Outpatient Rehab - OT (outpatient PT/OT.)


No Smoking: If you smoke, Please STOP!  Call [phone] for help.


Follow-up with: 


Ar Orourke MD [Primary Care Provider] -

## 2017-11-28 ENCOUNTER — HOSPITAL ENCOUNTER (OUTPATIENT)
Dept: HOSPITAL 76 - LAB.R | Age: 82
Discharge: HOME | End: 2017-11-28
Attending: INTERNAL MEDICINE
Payer: MEDICARE

## 2017-11-28 DIAGNOSIS — E78.5: Primary | ICD-10-CM

## 2017-11-28 DIAGNOSIS — Z79.899: ICD-10-CM

## 2017-11-28 PROCEDURE — 82550 ASSAY OF CK (CPK): CPT

## 2018-08-09 ENCOUNTER — HOSPITAL ENCOUNTER (OUTPATIENT)
Dept: HOSPITAL 76 - LAB.R | Age: 83
End: 2018-08-09
Attending: INTERNAL MEDICINE
Payer: MEDICARE

## 2018-08-09 DIAGNOSIS — Z12.5: Primary | ICD-10-CM

## 2018-08-09 DIAGNOSIS — E78.5: ICD-10-CM

## 2018-08-09 LAB
ALBUMIN DIAFP-MCNC: 3.9 G/DL (ref 3.2–5.5)
ALBUMIN/GLOB SERPL: 1.6 {RATIO} (ref 1–2.2)
ALP SERPL-CCNC: 59 IU/L (ref 42–121)
ALT SERPL W P-5'-P-CCNC: 24 IU/L (ref 10–60)
ANION GAP SERPL CALCULATED.4IONS-SCNC: 7 MMOL/L (ref 6–13)
AST SERPL W P-5'-P-CCNC: 24 IU/L (ref 10–42)
BASOPHILS NFR BLD AUTO: 0.1 10^3/UL (ref 0–0.1)
BASOPHILS NFR BLD AUTO: 2.5 %
BILIRUB BLD-MCNC: 0.9 MG/DL (ref 0.2–1)
BUN SERPL-MCNC: 26 MG/DL (ref 6–20)
CALCIUM UR-MCNC: 9.1 MG/DL (ref 8.5–10.3)
CHLORIDE SERPL-SCNC: 108 MMOL/L (ref 101–111)
CHOLEST SERPL-MCNC: 200 MG/DL
CO2 SERPL-SCNC: 26 MMOL/L (ref 21–32)
CREAT SERPLBLD-SCNC: 1 MG/DL (ref 0.6–1.2)
EOSINOPHIL # BLD AUTO: 0.1 10^3/UL (ref 0–0.7)
EOSINOPHIL NFR BLD AUTO: 2.4 %
ERYTHROCYTE [DISTWIDTH] IN BLOOD BY AUTOMATED COUNT: 13.6 % (ref 12–15)
GFRSERPLBLD MDRD-ARVRAT: 72 ML/MIN/{1.73_M2} (ref 89–?)
GLOBULIN SER-MCNC: 2.4 G/DL (ref 2.1–4.2)
GLUCOSE SERPL-MCNC: 93 MG/DL (ref 70–100)
HDLC SERPL-MCNC: 94 MG/DL
HDLC SERPL: 2.1 {RATIO} (ref ?–5)
HGB UR QL STRIP: 13.7 G/DL (ref 14–18)
LDLC SERPL CALC-MCNC: 96 MG/DL
LDLC/HDLC SERPL: 1 {RATIO} (ref ?–3.6)
LYMPHOCYTES # SPEC AUTO: 1.1 10^3/UL (ref 1.5–3.5)
LYMPHOCYTES NFR BLD AUTO: 26.3 %
MCH RBC QN AUTO: 30.6 PG (ref 27–31)
MCHC RBC AUTO-ENTMCNC: 33.4 G/DL (ref 32–36)
MCV RBC AUTO: 91.6 FL (ref 80–94)
MONOCYTES # BLD AUTO: 0.2 10^3/UL (ref 0–1)
MONOCYTES NFR BLD AUTO: 5.5 %
NEUTROPHILS # BLD AUTO: 2.7 10^3/UL (ref 1.5–6.6)
NEUTROPHILS # SNV AUTO: 4.3 X10^3/UL (ref 4.8–10.8)
NEUTROPHILS NFR BLD AUTO: 63.3 %
PDW BLD AUTO: 9.4 FL (ref 7.4–11.4)
PLATELET # BLD: 193 10^3/UL (ref 130–450)
PROT SPEC-MCNC: 6.3 G/DL (ref 6.7–8.2)
RBC MAR: 4.47 10^6/UL (ref 4.7–6.1)
SODIUM SERPLBLD-SCNC: 141 MMOL/L (ref 135–145)
VLDLC SERPL-SCNC: 10 MG/DL

## 2018-08-09 PROCEDURE — 84443 ASSAY THYROID STIM HORMONE: CPT

## 2018-08-09 PROCEDURE — 80053 COMPREHEN METABOLIC PANEL: CPT

## 2018-08-09 PROCEDURE — 84153 ASSAY OF PSA TOTAL: CPT

## 2018-08-09 PROCEDURE — 83721 ASSAY OF BLOOD LIPOPROTEIN: CPT

## 2018-08-09 PROCEDURE — 80061 LIPID PANEL: CPT

## 2018-08-09 PROCEDURE — 85025 COMPLETE CBC W/AUTO DIFF WBC: CPT

## 2019-07-11 ENCOUNTER — HOSPITAL ENCOUNTER (OUTPATIENT)
Dept: HOSPITAL 76 - LAB | Age: 84
Discharge: HOME | End: 2019-07-11
Attending: FAMILY MEDICINE
Payer: MEDICARE

## 2019-07-11 DIAGNOSIS — E78.5: ICD-10-CM

## 2019-07-11 DIAGNOSIS — C44.91: ICD-10-CM

## 2019-07-11 DIAGNOSIS — I69.951: Primary | ICD-10-CM

## 2019-07-11 LAB
ALBUMIN DIAFP-MCNC: 4.2 G/DL (ref 3.2–5.5)
ALBUMIN/GLOB SERPL: 1.6 {RATIO} (ref 1–2.2)
ALP SERPL-CCNC: 77 IU/L (ref 42–121)
ALT SERPL W P-5'-P-CCNC: 78 IU/L (ref 10–60)
ANION GAP SERPL CALCULATED.4IONS-SCNC: 11 MMOL/L (ref 6–13)
AST SERPL W P-5'-P-CCNC: 51 IU/L (ref 10–42)
BASOPHILS NFR BLD AUTO: 0 10^3/UL (ref 0–0.1)
BASOPHILS NFR BLD AUTO: 0.7 %
BILIRUB BLD-MCNC: 1 MG/DL (ref 0.2–1)
BUN SERPL-MCNC: 20 MG/DL (ref 6–20)
CALCIUM UR-MCNC: 9.3 MG/DL (ref 8.5–10.3)
CHLORIDE SERPL-SCNC: 105 MMOL/L (ref 101–111)
CHOLEST SERPL-MCNC: 194 MG/DL
CO2 SERPL-SCNC: 26 MMOL/L (ref 21–32)
CREAT SERPLBLD-SCNC: 0.9 MG/DL (ref 0.6–1.2)
EOSINOPHIL # BLD AUTO: 0.2 10^3/UL (ref 0–0.7)
EOSINOPHIL NFR BLD AUTO: 4.2 %
ERYTHROCYTE [DISTWIDTH] IN BLOOD BY AUTOMATED COUNT: 13.4 % (ref 12–15)
GFRSERPLBLD MDRD-ARVRAT: 81 ML/MIN/{1.73_M2} (ref 89–?)
GLOBULIN SER-MCNC: 2.6 G/DL (ref 2.1–4.2)
GLUCOSE SERPL-MCNC: 96 MG/DL (ref 70–100)
HDLC SERPL-MCNC: 83 MG/DL
HDLC SERPL: 2.3 {RATIO} (ref ?–5)
HGB UR QL STRIP: 14.2 G/DL (ref 14–18)
LDLC SERPL CALC-MCNC: 101 MG/DL
LDLC/HDLC SERPL: 1.2 {RATIO} (ref ?–3.6)
LYMPHOCYTES # SPEC AUTO: 1.4 10^3/UL (ref 1.5–3.5)
LYMPHOCYTES NFR BLD AUTO: 31.2 %
MCH RBC QN AUTO: 30.5 PG (ref 27–31)
MCHC RBC AUTO-ENTMCNC: 32 G/DL (ref 32–36)
MCV RBC AUTO: 95.3 FL (ref 80–94)
MONOCYTES # BLD AUTO: 0.3 10^3/UL (ref 0–1)
MONOCYTES NFR BLD AUTO: 6 %
NEUTROPHILS # BLD AUTO: 2.6 10^3/UL (ref 1.5–6.6)
NEUTROPHILS # SNV AUTO: 4.5 X10^3/UL (ref 4.8–10.8)
NEUTROPHILS NFR BLD AUTO: 57.7 %
PDW BLD AUTO: 11.4 FL (ref 7.4–11.4)
PLATELET # BLD: 199 10^3/UL (ref 130–450)
PROT SPEC-MCNC: 6.8 G/DL (ref 6.7–8.2)
RBC MAR: 4.66 10^6/UL (ref 4.7–6.1)
SODIUM SERPLBLD-SCNC: 142 MMOL/L (ref 135–145)
VLDLC SERPL-SCNC: 10 MG/DL

## 2019-07-11 PROCEDURE — 83721 ASSAY OF BLOOD LIPOPROTEIN: CPT

## 2019-07-11 PROCEDURE — 80061 LIPID PANEL: CPT

## 2019-07-11 PROCEDURE — 80053 COMPREHEN METABOLIC PANEL: CPT

## 2019-07-11 PROCEDURE — 85025 COMPLETE CBC W/AUTO DIFF WBC: CPT

## 2019-07-11 PROCEDURE — 84443 ASSAY THYROID STIM HORMONE: CPT

## 2019-07-11 PROCEDURE — 36415 COLL VENOUS BLD VENIPUNCTURE: CPT

## 2020-08-26 ENCOUNTER — HOSPITAL ENCOUNTER (OUTPATIENT)
Dept: HOSPITAL 76 - LAB.WCP | Age: 85
Discharge: HOME | End: 2020-08-26
Attending: FAMILY MEDICINE
Payer: MEDICARE

## 2020-08-26 DIAGNOSIS — I69.951: Primary | ICD-10-CM

## 2020-08-26 LAB
ALBUMIN DIAFP-MCNC: 3.9 G/DL (ref 3.2–5.5)
ALBUMIN/GLOB SERPL: 1.6 {RATIO} (ref 1–2.2)
ALP SERPL-CCNC: 65 IU/L (ref 42–121)
ALT SERPL W P-5'-P-CCNC: 21 IU/L (ref 10–60)
ANION GAP SERPL CALCULATED.4IONS-SCNC: 6 MMOL/L (ref 6–13)
AST SERPL W P-5'-P-CCNC: 20 IU/L (ref 10–42)
BASOPHILS NFR BLD AUTO: 0 10^3/UL (ref 0–0.1)
BASOPHILS NFR BLD AUTO: 0.6 %
BILIRUB BLD-MCNC: 0.8 MG/DL (ref 0.2–1)
BUN SERPL-MCNC: 26 MG/DL (ref 6–20)
CALCIUM UR-MCNC: 9.3 MG/DL (ref 8.5–10.3)
CHLORIDE SERPL-SCNC: 104 MMOL/L (ref 101–111)
CHOLEST SERPL-MCNC: 271 MG/DL
CO2 SERPL-SCNC: 29 MMOL/L (ref 21–32)
CREAT SERPLBLD-SCNC: 1 MG/DL (ref 0.6–1.2)
EOSINOPHIL # BLD AUTO: 0.1 10^3/UL (ref 0–0.7)
EOSINOPHIL NFR BLD AUTO: 1.5 %
ERYTHROCYTE [DISTWIDTH] IN BLOOD BY AUTOMATED COUNT: 13 % (ref 12–15)
GLOBULIN SER-MCNC: 2.4 G/DL (ref 2.1–4.2)
GLUCOSE SERPL-MCNC: 104 MG/DL (ref 70–100)
HDLC SERPL-MCNC: 92 MG/DL
HDLC SERPL: 2.9 {RATIO} (ref ?–5)
HGB UR QL STRIP: 14.2 G/DL (ref 14–18)
LYMPHOCYTES # SPEC AUTO: 1.5 10^3/UL (ref 1.5–3.5)
LYMPHOCYTES NFR BLD AUTO: 27 %
MCH RBC QN AUTO: 31.3 PG (ref 27–31)
MCHC RBC AUTO-ENTMCNC: 32.1 G/DL (ref 32–36)
MCV RBC AUTO: 97.6 FL (ref 80–94)
MONOCYTES # BLD AUTO: 0.4 10^3/UL (ref 0–1)
MONOCYTES NFR BLD AUTO: 7.4 %
NEUTROPHILS # BLD AUTO: 3.4 10^3/UL (ref 1.5–6.6)
NEUTROPHILS # SNV AUTO: 5.4 X10^3/UL (ref 4.8–10.8)
NEUTROPHILS NFR BLD AUTO: 63.1 %
PDW BLD AUTO: 11 FL (ref 7.4–11.4)
PLATELET # BLD: 232 10^3/UL (ref 130–450)
PROT SPEC-MCNC: 6.3 G/DL (ref 6.7–8.2)
RBC MAR: 4.54 10^6/UL (ref 4.7–6.1)
SODIUM SERPLBLD-SCNC: 139 MMOL/L (ref 135–145)

## 2020-08-26 PROCEDURE — 80053 COMPREHEN METABOLIC PANEL: CPT

## 2020-08-26 PROCEDURE — 36415 COLL VENOUS BLD VENIPUNCTURE: CPT

## 2020-08-26 PROCEDURE — 83721 ASSAY OF BLOOD LIPOPROTEIN: CPT

## 2020-08-26 PROCEDURE — 84443 ASSAY THYROID STIM HORMONE: CPT

## 2020-08-26 PROCEDURE — 85025 COMPLETE CBC W/AUTO DIFF WBC: CPT

## 2020-08-26 PROCEDURE — 80061 LIPID PANEL: CPT

## 2020-10-23 ENCOUNTER — HOSPITAL ENCOUNTER (OUTPATIENT)
Dept: HOSPITAL 76 - LAB | Age: 85
Discharge: HOME | End: 2020-10-23
Attending: SURGERY
Payer: MEDICARE

## 2020-10-23 DIAGNOSIS — K40.90: ICD-10-CM

## 2020-10-23 DIAGNOSIS — Z20.828: ICD-10-CM

## 2020-10-23 DIAGNOSIS — Z01.812: Primary | ICD-10-CM

## 2020-10-27 ENCOUNTER — HOSPITAL ENCOUNTER (OUTPATIENT)
Dept: HOSPITAL 76 - SDS | Age: 85
Discharge: HOME | End: 2020-10-27
Attending: SURGERY
Payer: MEDICARE

## 2020-10-27 VITALS — SYSTOLIC BLOOD PRESSURE: 124 MMHG | DIASTOLIC BLOOD PRESSURE: 80 MMHG

## 2020-10-27 DIAGNOSIS — Z79.899: ICD-10-CM

## 2020-10-27 DIAGNOSIS — I69.931: ICD-10-CM

## 2020-10-27 DIAGNOSIS — Z79.82: ICD-10-CM

## 2020-10-27 DIAGNOSIS — K40.90: Primary | ICD-10-CM

## 2020-10-27 DIAGNOSIS — E78.5: ICD-10-CM

## 2020-10-27 PROCEDURE — 0YU50JZ SUPPLEMENT RIGHT INGUINAL REGION WITH SYNTHETIC SUBSTITUTE, OPEN APPROACH: ICD-10-PCS | Performed by: SURGERY

## 2020-10-27 PROCEDURE — 49505 PRP I/HERN INIT REDUC >5 YR: CPT

## 2020-10-27 NOTE — ANESTHESIA POST OP EVALUATION
Anesthesia Post Eval





- Post Anesthesia Eval


Vitals: 





                                Last Vital Signs











Temp  36.3 C L  10/27/20 13:02


 


Pulse  57 L  10/27/20 13:02


 


Resp  16   10/27/20 13:02


 


BP  132/75 H  10/27/20 13:02


 


Pulse Ox  100   10/27/20 13:02











CV Function Including HR & BP: positive: Stable


Pain Control: positive: Satisfactory


Nausea & Vomiting: positive: Negative


Mental Status: positive: Baseline


Respiratory Status: Airway Patent


Hydration Status: Satisfactory


Anesthesia Complications: positive: None

## 2020-10-27 NOTE — ANESTHESIA
Pre-Anesthesia VS, & Labs





- Diagnosis





left inguinal hernia





- Procedure





Open Inguinal Hernia Repair with mesh


Vital Signs: 





                                        











Temp Pulse Resp BP Pulse Ox


 


 36.2 C L  72   16   153/90 H  16 L


 


 10/27/20 09:39  10/27/20 09:39  10/27/20 09:39  10/27/20 09:39  10/27/20 09:39











Height: 5 ft 7 in


Weight (kg): 69 kg


Body Mass Index: 23.8


BMI Classification: Healthy weight





- NPO


>8 hours





- Lab Results


Lab results reviewed: Yes





Home Medications and Allergies


Home Medications: 


Ambulatory Orders





Aspirin [Aspirin EC] 81 mg PO DAILY 10/23/20 


Rosuvastatin Calcium 10 mg PO QPM 10/23/20 











                                        





Cholecalciferol (Vitamin D3) [Vitamin D3] 1,000 units PO DAILY 11/16/17 


Multivitamin [Theragran] 1 tab PO DAILY 11/16/17 


Aspirin [Aspirin EC] 81 mg PO DAILY 10/23/20 


Rosuvastatin Calcium 10 mg PO QPM 10/23/20 








Allergies/Adverse Reactions: 


                                    Allergies











Allergy/AdvReac Type Severity Reaction Status Date / Time


 


No Known Drug Allergies Allergy   Verified 11/16/17 11:38














Anes History & Medical History





- Anesthetic History


Anesthesia Complications: reports: No previous complications


Family history of Anesthesia Complications: Denies


Family history of Malignant Hyperthermia: Denies





- Medical History


Cardiovascular: reports: High cholesterol


Pulmonary: reports: None


Gastrointestinal: reports: None


Urinary: reports: None


Neuro: reports: CVA (2017, has some residual neuropathy right hand)


Musculoskeletal: reports: None


Endocrine/Autoimmune: reports: None


Blood Disorders: reports: None


Skin: reports: Other


Smoking Status: Never smoker





- Surgical History


Dermatologic: Skin cancer surgery





Exam


General: Alert, Oriented x3, Cooperative, No acute distress


Mouth Opening: 3 Fingerbreadth


Neck Mobility: Normal


Mallampati classification: II


Respiratory: Lungs clear, Normal breath sounds, No respiratory distress, No 

accessory muscle use


Cardiovascular: Regular rate, Normal S1, Normal S2, No murmurs





Plan


Anesthesia Type: MAC


Consent for Procedure(s) Verified and Reviewed: Yes


Code Status: Attempt Resuscitation


ASA classification: 2-Mild systemic disease


Is this case an emergency?: No

## 2020-10-27 NOTE — OPERATIVE REPORT
DATE OF SERVICE: 10/27/2020

Physician: Calvin Guevara MD

 

PREOPERATIVE DIAGNOSIS:  Left inguinal hernia.

 

POSTOPERATIVE DIAGNOSIS:  Left inguinal hernia, very large direct.

 

PROCEDURE PERFORMED:  Open left inguinal hernia repair with mesh, Jermaine.

 

SURGEON:  Calvin Guevara MD

 

ASSISTANT:  None.

 

ANESTHESIA:  Monitored anesthesia care, IV sedation, local anesthesia.

 

COMPLICATIONS:  None.

 

SPECIMEN:  Ilioinguinal nerve was removed.  However, not sent for pathology.

 

ESTIMATED BLOOD LOSS:  None.

 

COMPLICATIONS:  None.

 

DRAINS:  None.

 

PROSTHETIC:  Polypropylene mesh.

 

FINDINGS:  His ilioinguinal nerve was very fine and branched.  It was removed 
due to this.  He had a large direct hernia with a bulge approximately 5 x 5 cm.

 

INDICATIONS FOR PROCEDURE:  Patient is an active 85-year-old gentleman with a 
symptomatic left inguinal hernia.  He presents for open repair with mesh.  Risks
discussed, alternatives discussed, all questions answered, and consent obtained.

 

DETAILS OF PROCEDURE:  Patient was properly identified and brought to the 
operating room and placed in supine position.  Monitored anesthesia care was 
given as well as IV sedation.  He was prepped and draped in a sterile fashion 
and given preoperative antibiotics.  Local anesthetic was given throughout the 
procedure.  



A 5 cm incision was made in the direction of Radha's lines just cephalad of the
pubic tubercle.  Dissection proceeded with cutting current.  The superficial 
epigastric vein was identified, clamped, divided, and tied with 3-0 Vicryl.  
Dissection proceeded down to the aponeurosis.  The aponeurosis was opened in the
direction of its fibers extending to the external ring.  He had a very fine 
ilioinguinal nerve, which branched and ran in a medial direction.  He had a very
large underlying direct defect.  I thought it best to excise the nerve back to 
musculature.  The cord structures were mobilized and brought up.  The direct 
bulging tissue was mobilized away from the surrounding tissue.  The floor was 
then repaired with a 2-0 silk pursestring suture reducing the direct defect.  
There was no indirect defect.  A polypropylene mesh was then cut to size and 
with tails.  It was secured with multiple interrupted 0 Ti-Cron sutures.  
Sutures were placed over the pubic tubercle along the shelving border of 
Poupart's ligament and medially along the musculature or fascia of the internal 
oblique.  The medial tail of the mesh was secured to the shelving border of 
Poupart's ligament with 3 interrupted 0 Ti-Cron sutures.  Additional suture was 
placed in the crotch of the mesh, recreating the internal ring of appropriate 
size.  The aponeurosis was closed with a running 2-0 Vicryl suture.  Wild's 
was closed with interrupted 3-0 Vicryl suture.  Skin was closed with a running 
4-0 Monocryl subcuticular suture.  Dressing was applied.  He tolerated the 
procedure very well.

 

 

DD: 10/27/2020 12:53

TD: 10/27/2020 12:57

Job #: [id number]

NAYE

## 2020-10-27 NOTE — OPERATIVE REPORT
Operative Report





- General


Procedure Date: 10/27/20


Pre-Op Diagnosis: left inguinal hernia


Procedure Performed: 





open left inguinal hernia


Post Op Diagnosis: left inguinal hernia





- Procedure Note


Primary Surgeon: marcos zhang


Anesthesia Technique: Local, MAC


Estimated Blood Loss (mL): 0


Findings: 





large direct


Complications: 





none

## 2021-09-01 ENCOUNTER — HOSPITAL ENCOUNTER (OUTPATIENT)
Dept: HOSPITAL 76 - LAB.WCP | Age: 86
Discharge: HOME | End: 2021-09-01
Attending: FAMILY MEDICINE
Payer: MEDICARE

## 2021-09-01 DIAGNOSIS — I69.30: ICD-10-CM

## 2021-09-01 DIAGNOSIS — Z00.00: Primary | ICD-10-CM

## 2021-09-01 DIAGNOSIS — E78.5: ICD-10-CM

## 2021-09-01 LAB
ALBUMIN DIAFP-MCNC: 4.1 G/DL (ref 3.2–5.5)
ALBUMIN/GLOB SERPL: 1.5 {RATIO} (ref 1–2.2)
ALP SERPL-CCNC: 64 IU/L (ref 42–121)
ALT SERPL W P-5'-P-CCNC: 22 IU/L (ref 10–60)
ANION GAP SERPL CALCULATED.4IONS-SCNC: 7 MMOL/L (ref 6–13)
AST SERPL W P-5'-P-CCNC: 22 IU/L (ref 10–42)
BASOPHILS NFR BLD AUTO: 0 10^3/UL (ref 0–0.1)
BASOPHILS NFR BLD AUTO: 0.7 %
BILIRUB BLD-MCNC: 0.7 MG/DL (ref 0.2–1)
BUN SERPL-MCNC: 24 MG/DL (ref 6–20)
CALCIUM UR-MCNC: 9.4 MG/DL (ref 8.5–10.3)
CHLORIDE SERPL-SCNC: 105 MMOL/L (ref 101–111)
CHOLEST SERPL-MCNC: 200 MG/DL
CO2 SERPL-SCNC: 27 MMOL/L (ref 21–32)
CREAT SERPLBLD-SCNC: 1 MG/DL (ref 0.6–1.2)
EOSINOPHIL # BLD AUTO: 0.1 10^3/UL (ref 0–0.7)
EOSINOPHIL NFR BLD AUTO: 2.5 %
ERYTHROCYTE [DISTWIDTH] IN BLOOD BY AUTOMATED COUNT: 13.1 % (ref 12–15)
GFRSERPLBLD MDRD-ARVRAT: 71 ML/MIN/{1.73_M2} (ref 89–?)
GLOBULIN SER-MCNC: 2.8 G/DL (ref 2.1–4.2)
GLUCOSE SERPL-MCNC: 95 MG/DL (ref 70–100)
HCT VFR BLD AUTO: 45 % (ref 42–52)
HDLC SERPL-MCNC: 88 MG/DL
HDLC SERPL: 2.3 {RATIO} (ref ?–5)
HGB UR QL STRIP: 14.2 G/DL (ref 14–18)
LYMPHOCYTES # SPEC AUTO: 1.4 10^3/UL (ref 1.5–3.5)
LYMPHOCYTES NFR BLD AUTO: 32 %
MCH RBC QN AUTO: 30.4 PG (ref 27–31)
MCHC RBC AUTO-ENTMCNC: 31.6 G/DL (ref 32–36)
MCV RBC AUTO: 96.4 FL (ref 80–94)
MONOCYTES # BLD AUTO: 0.3 10^3/UL (ref 0–1)
MONOCYTES NFR BLD AUTO: 7.4 %
NEUTROPHILS # BLD AUTO: 2.6 10^3/UL (ref 1.5–6.6)
NEUTROPHILS # SNV AUTO: 4.4 X10^3/UL (ref 4.8–10.8)
NEUTROPHILS NFR BLD AUTO: 57.4 %
NRBC # BLD AUTO: 0 /100WBC
NRBC # BLD AUTO: 0 X10^3/UL
PDW BLD AUTO: 11.8 FL (ref 7.4–11.4)
PLATELET # BLD: 204 10^3/UL (ref 130–450)
POTASSIUM SERPL-SCNC: 4.6 MMOL/L (ref 3.5–5)
PROT SPEC-MCNC: 6.9 G/DL (ref 6.7–8.2)
RBC MAR: 4.67 10^6/UL (ref 4.7–6.1)
SODIUM SERPLBLD-SCNC: 139 MMOL/L (ref 135–145)
TRIGL P FAST SERPL-MCNC: 38 MG/DL
TSH SERPL-ACNC: 1.36 UIU/ML (ref 0.34–5.6)

## 2021-09-01 PROCEDURE — 84443 ASSAY THYROID STIM HORMONE: CPT

## 2021-09-01 PROCEDURE — 85025 COMPLETE CBC W/AUTO DIFF WBC: CPT

## 2021-09-01 PROCEDURE — 80053 COMPREHEN METABOLIC PANEL: CPT

## 2021-09-01 PROCEDURE — 83721 ASSAY OF BLOOD LIPOPROTEIN: CPT

## 2021-09-01 PROCEDURE — 36415 COLL VENOUS BLD VENIPUNCTURE: CPT

## 2021-09-01 PROCEDURE — 80061 LIPID PANEL: CPT

## 2021-09-27 ENCOUNTER — HOSPITAL ENCOUNTER (OUTPATIENT)
Dept: HOSPITAL 76 - DI | Age: 86
Discharge: HOME | End: 2021-09-27
Attending: FAMILY MEDICINE
Payer: MEDICARE

## 2021-09-27 DIAGNOSIS — R41.3: Primary | ICD-10-CM

## 2021-09-27 DIAGNOSIS — I69.30: ICD-10-CM

## 2021-09-27 PROCEDURE — 70553 MRI BRAIN STEM W/O & W/DYE: CPT

## 2021-09-27 NOTE — MRI REPORT
PROCEDURE:  Brain W/WO

 

INDICATIONS:  MEMORY LOSS,LATE EFFECT OF CARDIOEMBOLIC STROKE

 

CONTRAST:  IV CONTRAST: Gadavist ml: 7  

 

TECHNIQUE:  

Noncontrast axial T1 spin echo, axial T2 fast spin echo, sagittal and axial FLAIR, coronal T2 fast sp
in echo, axial gradient echo, axial diffusion and ADC through the brain.  After the administration of
 contrast, axial and coronal T1 spin echo with fat saturation through the brain.  

 

COMPARISON:  11/16/2027

 

FINDINGS:  

Image quality:  Excellent.  

 

CSF spaces:  Basal cisterns are patent.  No extra-axial fluid collections.  Ventricles are normal in 
size and shape.  

 

Brain:  No midline shift.  No intracranial bleeds or masses. Encephalomalacia in the left PCA territo
ry corresponding with the area of infarction on prior MRI is seen.  No abnormal intracranial enhancem
ent.  There is cerebral volume loss for age with ex vacuo dilatation of the ventricles. Minimal small
 vessel ischemic changes are noted. The brainstem appears normal.  Diffusion-weighted images demonstr
ate no acute ischemic insults.  No chronic ischemic insults.  Normal intravascular flow voids are pre
sent.  

 

Skull and face:  Calvarial marrow is normal in signal.  Orbits appear normal.  

 

Sinuses:  Sinuses and mastoids appear clear.  

 

IMPRESSION:  

1. Encephalomalacia in the left PCA territory consistent with a remote infarct.

2. Moderate generalized cortical atrophy with compensatory ventricular enlargement.

3. No acute intracranial abnormality.

 

Reviewed by: Adriel Campoverde on 9/27/2021 9:46 AM PDT

Approved by: Adriel Campoverde on 9/27/2021 9:46 AM PDT

 

 

Station ID:  [id number]

## 2021-10-21 ENCOUNTER — HOSPITAL ENCOUNTER (OUTPATIENT)
Dept: HOSPITAL 76 - DI | Age: 86
Discharge: HOME | End: 2021-10-21
Attending: FAMILY MEDICINE
Payer: MEDICARE

## 2021-10-21 DIAGNOSIS — R94.31: ICD-10-CM

## 2021-10-21 DIAGNOSIS — R06.09: Primary | ICD-10-CM

## 2021-10-21 DIAGNOSIS — Z86.39: ICD-10-CM

## 2021-10-21 PROCEDURE — 93350 STRESS TTE ONLY: CPT

## 2021-10-21 NOTE — CARDIAC PROCEDURE NOTE
Stress Test Report


Service Date: 10/21/21


Service Time: 08:00


Ordering Provider: Alvaro Jimenez MD


Indication for Test: 


Assess exertional dyspnea.





Significant Medical History: 


Mr. Carter has a history of treated hyperlipidemia and 4 years ago had a mild 

stroke that resulted in persistent right-sided numbness, though not weakness, he

says.  He remains active on a daily basis, doing yard work and gardening. His 

view is that he is "slowing down" and he gives examples of this occurring both 

during physical activities and while driving.  He does not know why he slows 

down and to my interview he denies exertional shortness of breath, chest 

discomfort, palpitations, lightheadedness and extremity edema.





Cardiac Risk Factors: 


History of treated hyperlipidemia; he denies hypertension, diabetes, ever 

smoking and any known family history of heart disease.





Type of Stress Test: ETT with Echocardiography


Procedure: 


-Exercise Treadmill Test-





After signing informed consent, the patient underwent resting echo imaging. He 

then performed treadmill exercise using a Modified [employer] protocol. The patient 

exercised for 8  minutes 48 seconds and achieved a peak heart rate of 125 (91 

percent predicted maximum heart rate for age), and an estimated workload of 4.6 

METS.





The test was terminated due to fatigue after having achieved target heart rate.





Resting heart rate: 77   Peak heart rate: 125   Normal response to exercise.


Resting BP: 143/85   Peak BP: 174/85   Normal BP response to exercise.


Rhythm during exercise: Sinus rhythm throughout. 


Symptoms: He denied specific symptoms, only endorsed fatigue.


EKG at rest showed normal sinus rhythm with bifascicular block (left anterior 

fascicular and right bundle branch block).


EKG at peak stress showed no ischemia by EKG criteria.


In Recovery heart rate returned to baseline normally; BP gradually decreased 

towards baseline level.





Echo imaging performed at rest and with stress will be reported separately.





IKenji MD, was present throughout this treadmill stress test and 

supervised it in its entirety.





Summary: 


1) Exercise tolerance probably about average for age as evidenced by attainment 

of 4.6 METS; no YESIKA available for modified [employer] protocol.


2) Abnormal resting EKG, showing bifascicular block, no evidence of prior 

infarct.


3) Adequate level of exercise was achieved on this treadmill stress test. 


4) Normal BP response to exercise.


5) No ischemic changes by EKG criteria were seen at peak stress.


6) Echo image interpretation reveals normal resting left ventricular systolic 

function with mild concentric hypertrophy, with appropriate augmentation of all 

segments with stress, indicative of no evidence of prior infarct or inducible 

ischemia. See separate report for more detail.





CONCLUSIONS:


1) Low risk treadmill stress echocardiogram.


2) Note that a diagnostic echocardiogram in 2017 showed left ventricular 

hypertrophy with evidence of diastolic dysfunction, but there was no evidence of

abnormal right sided filling pressure on screening for the current stress 

echocardiogram.

## 2022-07-27 ENCOUNTER — HOSPITAL ENCOUNTER (OUTPATIENT)
Dept: HOSPITAL 76 - LAB | Age: 87
Discharge: HOME | End: 2022-07-27
Attending: FAMILY MEDICINE
Payer: MEDICARE

## 2022-07-27 DIAGNOSIS — F03.90: Primary | ICD-10-CM

## 2022-07-27 DIAGNOSIS — E78.5: ICD-10-CM

## 2022-07-27 DIAGNOSIS — I69.951: ICD-10-CM

## 2022-07-27 LAB
ALBUMIN DIAFP-MCNC: 4 G/DL (ref 3.2–5.5)
ALBUMIN/GLOB SERPL: 1.6 {RATIO} (ref 1–2.2)
ALP SERPL-CCNC: 67 IU/L (ref 42–121)
ALT SERPL W P-5'-P-CCNC: 27 IU/L (ref 10–60)
ANION GAP SERPL CALCULATED.4IONS-SCNC: 7 MMOL/L (ref 6–13)
AST SERPL W P-5'-P-CCNC: 26 IU/L (ref 10–42)
BASOPHILS NFR BLD AUTO: 0 10^3/UL (ref 0–0.1)
BASOPHILS NFR BLD AUTO: 0.8 %
BILIRUB BLD-MCNC: 0.8 MG/DL (ref 0.2–1)
BUN SERPL-MCNC: 26 MG/DL (ref 6–20)
CALCIUM UR-MCNC: 9.4 MG/DL (ref 8.5–10.3)
CHLORIDE SERPL-SCNC: 106 MMOL/L (ref 101–111)
CHOLEST SERPL-MCNC: 195 MG/DL
CO2 SERPL-SCNC: 28 MMOL/L (ref 21–32)
CREAT SERPLBLD-SCNC: 1 MG/DL (ref 0.6–1.2)
EOSINOPHIL # BLD AUTO: 0.3 10^3/UL (ref 0–0.7)
EOSINOPHIL NFR BLD AUTO: 5.8 %
ERYTHROCYTE [DISTWIDTH] IN BLOOD BY AUTOMATED COUNT: 13.1 % (ref 12–15)
GFRSERPLBLD MDRD-ARVRAT: 71 ML/MIN/{1.73_M2} (ref 89–?)
GLOBULIN SER-MCNC: 2.5 G/DL (ref 2.1–4.2)
GLUCOSE SERPL-MCNC: 101 MG/DL (ref 70–100)
HCT VFR BLD AUTO: 42.9 % (ref 42–52)
HDLC SERPL-MCNC: 92 MG/DL
HDLC SERPL: 2.1 {RATIO} (ref ?–5)
HGB UR QL STRIP: 13.9 G/DL (ref 14–18)
LDLC SERPL CALC-MCNC: 95 MG/DL
LDLC/HDLC SERPL: 1 {RATIO} (ref ?–3.6)
LYMPHOCYTES # SPEC AUTO: 2 10^3/UL (ref 1.5–3.5)
LYMPHOCYTES NFR BLD AUTO: 37.7 %
MCH RBC QN AUTO: 30.5 PG (ref 27–31)
MCHC RBC AUTO-ENTMCNC: 32.4 G/DL (ref 32–36)
MCV RBC AUTO: 94.1 FL (ref 80–94)
MONOCYTES # BLD AUTO: 0.4 10^3/UL (ref 0–1)
MONOCYTES NFR BLD AUTO: 6.6 %
NEUTROPHILS # BLD AUTO: 2.6 10^3/UL (ref 1.5–6.6)
NEUTROPHILS # SNV AUTO: 5.3 X10^3/UL (ref 4.8–10.8)
NEUTROPHILS NFR BLD AUTO: 48.9 %
NRBC # BLD AUTO: 0 /100WBC
NRBC # BLD AUTO: 0 X10^3/UL
PDW BLD AUTO: 10.8 FL (ref 7.4–11.4)
PLATELET # BLD: 183 10^3/UL (ref 130–450)
POTASSIUM SERPL-SCNC: 4.4 MMOL/L (ref 3.5–5)
PROT SPEC-MCNC: 6.5 G/DL (ref 6.7–8.2)
RBC MAR: 4.56 10^6/UL (ref 4.7–6.1)
SODIUM SERPLBLD-SCNC: 141 MMOL/L (ref 135–145)
TRIGL P FAST SERPL-MCNC: 41 MG/DL
TSH SERPL-ACNC: 1.34 UIU/ML (ref 0.34–5.6)
VLDLC SERPL-SCNC: 8 MG/DL

## 2022-07-27 PROCEDURE — 80061 LIPID PANEL: CPT

## 2022-07-27 PROCEDURE — 85025 COMPLETE CBC W/AUTO DIFF WBC: CPT

## 2022-07-27 PROCEDURE — 36415 COLL VENOUS BLD VENIPUNCTURE: CPT

## 2022-07-27 PROCEDURE — 83721 ASSAY OF BLOOD LIPOPROTEIN: CPT

## 2022-07-27 PROCEDURE — 80053 COMPREHEN METABOLIC PANEL: CPT

## 2022-07-27 PROCEDURE — 84443 ASSAY THYROID STIM HORMONE: CPT
